# Patient Record
Sex: MALE | Race: WHITE | NOT HISPANIC OR LATINO | Employment: FULL TIME | URBAN - METROPOLITAN AREA
[De-identification: names, ages, dates, MRNs, and addresses within clinical notes are randomized per-mention and may not be internally consistent; named-entity substitution may affect disease eponyms.]

---

## 2019-08-14 ENCOUNTER — HOSPITAL ENCOUNTER (OUTPATIENT)
Dept: LAB | Facility: MEDICAL CENTER | Age: 49
End: 2019-08-14
Attending: FAMILY MEDICINE
Payer: MEDICAID

## 2019-08-14 LAB
ALBUMIN SERPL BCP-MCNC: 4.3 G/DL (ref 3.2–4.9)
ALBUMIN/GLOB SERPL: 1.3 G/DL
ALP SERPL-CCNC: 63 U/L (ref 30–99)
ALT SERPL-CCNC: 18 U/L (ref 2–50)
ANION GAP SERPL CALC-SCNC: 11 MMOL/L (ref 0–11.9)
APPEARANCE UR: CLEAR
AST SERPL-CCNC: 18 U/L (ref 12–45)
BASOPHILS # BLD AUTO: 0.6 % (ref 0–1.8)
BASOPHILS # BLD: 0.05 K/UL (ref 0–0.12)
BILIRUB SERPL-MCNC: 1.2 MG/DL (ref 0.1–1.5)
BILIRUB UR QL STRIP.AUTO: ABNORMAL
BUN SERPL-MCNC: 12 MG/DL (ref 8–22)
CALCIUM SERPL-MCNC: 9 MG/DL (ref 8.5–10.5)
CHLORIDE SERPL-SCNC: 104 MMOL/L (ref 96–112)
CHOLEST SERPL-MCNC: 165 MG/DL (ref 100–199)
CO2 SERPL-SCNC: 23 MMOL/L (ref 20–33)
COLOR UR: YELLOW
CREAT SERPL-MCNC: 0.96 MG/DL (ref 0.5–1.4)
EOSINOPHIL # BLD AUTO: 0.39 K/UL (ref 0–0.51)
EOSINOPHIL NFR BLD: 5 % (ref 0–6.9)
ERYTHROCYTE [DISTWIDTH] IN BLOOD BY AUTOMATED COUNT: 40 FL (ref 35.9–50)
FASTING STATUS PATIENT QL REPORTED: NORMAL
GLOBULIN SER CALC-MCNC: 3.2 G/DL (ref 1.9–3.5)
GLUCOSE SERPL-MCNC: 99 MG/DL (ref 65–99)
GLUCOSE UR STRIP.AUTO-MCNC: NEGATIVE MG/DL
HCT VFR BLD AUTO: 45 % (ref 42–52)
HDLC SERPL-MCNC: 53 MG/DL
HGB BLD-MCNC: 15.2 G/DL (ref 14–18)
IMM GRANULOCYTES # BLD AUTO: 0.02 K/UL (ref 0–0.11)
IMM GRANULOCYTES NFR BLD AUTO: 0.3 % (ref 0–0.9)
KETONES UR STRIP.AUTO-MCNC: ABNORMAL MG/DL
LDLC SERPL CALC-MCNC: 90 MG/DL
LEUKOCYTE ESTERASE UR QL STRIP.AUTO: NEGATIVE
LYMPHOCYTES # BLD AUTO: 1.76 K/UL (ref 1–4.8)
LYMPHOCYTES NFR BLD: 22.6 % (ref 22–41)
MCH RBC QN AUTO: 31.3 PG (ref 27–33)
MCHC RBC AUTO-ENTMCNC: 33.8 G/DL (ref 33.7–35.3)
MCV RBC AUTO: 92.6 FL (ref 81.4–97.8)
MICRO URNS: ABNORMAL
MONOCYTES # BLD AUTO: 0.75 K/UL (ref 0–0.85)
MONOCYTES NFR BLD AUTO: 9.6 % (ref 0–13.4)
NEUTROPHILS # BLD AUTO: 4.83 K/UL (ref 1.82–7.42)
NEUTROPHILS NFR BLD: 61.9 % (ref 44–72)
NITRITE UR QL STRIP.AUTO: NEGATIVE
NRBC # BLD AUTO: 0 K/UL
NRBC BLD-RTO: 0 /100 WBC
PH UR STRIP.AUTO: 5.5 [PH] (ref 5–8)
PLATELET # BLD AUTO: 188 K/UL (ref 164–446)
PMV BLD AUTO: 10.9 FL (ref 9–12.9)
POTASSIUM SERPL-SCNC: 3.4 MMOL/L (ref 3.6–5.5)
PROT SERPL-MCNC: 7.5 G/DL (ref 6–8.2)
PROT UR QL STRIP: NEGATIVE MG/DL
RBC # BLD AUTO: 4.86 M/UL (ref 4.7–6.1)
RBC UR QL AUTO: NEGATIVE
SODIUM SERPL-SCNC: 138 MMOL/L (ref 135–145)
SP GR UR STRIP.AUTO: 1.02
TRIGL SERPL-MCNC: 108 MG/DL (ref 0–149)
UROBILINOGEN UR STRIP.AUTO-MCNC: 1 MG/DL
WBC # BLD AUTO: 7.8 K/UL (ref 4.8–10.8)

## 2019-08-14 PROCEDURE — 83036 HEMOGLOBIN GLYCOSYLATED A1C: CPT

## 2019-08-14 PROCEDURE — 80061 LIPID PANEL: CPT

## 2019-08-14 PROCEDURE — 81003 URINALYSIS AUTO W/O SCOPE: CPT

## 2019-08-14 PROCEDURE — 85025 COMPLETE CBC W/AUTO DIFF WBC: CPT

## 2019-08-14 PROCEDURE — 80053 COMPREHEN METABOLIC PANEL: CPT

## 2019-08-14 PROCEDURE — 36415 COLL VENOUS BLD VENIPUNCTURE: CPT

## 2019-08-15 LAB
EST. AVERAGE GLUCOSE BLD GHB EST-MCNC: 105 MG/DL
HBA1C MFR BLD: 5.3 % (ref 0–5.6)

## 2019-08-26 ENCOUNTER — HOSPITAL ENCOUNTER (OUTPATIENT)
Dept: LAB | Facility: MEDICAL CENTER | Age: 49
End: 2019-08-26
Attending: PHYSICIAN ASSISTANT
Payer: MEDICAID

## 2019-08-26 LAB
ANION GAP SERPL CALC-SCNC: 6 MMOL/L (ref 0–11.9)
BUN SERPL-MCNC: 13 MG/DL (ref 8–22)
CALCIUM SERPL-MCNC: 9.5 MG/DL (ref 8.5–10.5)
CHLORIDE SERPL-SCNC: 106 MMOL/L (ref 96–112)
CO2 SERPL-SCNC: 28 MMOL/L (ref 20–33)
CREAT SERPL-MCNC: 1.11 MG/DL (ref 0.5–1.4)
GLUCOSE SERPL-MCNC: 99 MG/DL (ref 65–99)
POTASSIUM SERPL-SCNC: 4.2 MMOL/L (ref 3.6–5.5)
SODIUM SERPL-SCNC: 140 MMOL/L (ref 135–145)

## 2019-08-26 PROCEDURE — 36415 COLL VENOUS BLD VENIPUNCTURE: CPT

## 2019-08-26 PROCEDURE — 80048 BASIC METABOLIC PNL TOTAL CA: CPT

## 2019-09-11 ENCOUNTER — NON-PROVIDER VISIT (OUTPATIENT)
Dept: URGENT CARE | Facility: CLINIC | Age: 49
End: 2019-09-11

## 2019-09-11 DIAGNOSIS — Z02.1 PRE-EMPLOYMENT DRUG SCREENING: ICD-10-CM

## 2019-09-11 LAB
AMP AMPHETAMINE: NORMAL
COC COCAINE: NORMAL
INT CON NEG: NEGATIVE
INT CON POS: POSITIVE
MET METHAMPHETAMINES: NORMAL
OPI OPIATES: NORMAL
PCP PHENCYCLIDINE: NORMAL
POC DRUG COMMENT 753798-OCCUPATIONAL HEALTH: NEGATIVE
THC: NORMAL

## 2019-09-11 PROCEDURE — 80305 DRUG TEST PRSMV DIR OPT OBS: CPT | Performed by: PHYSICIAN ASSISTANT

## 2022-02-19 ENCOUNTER — OFFICE VISIT (OUTPATIENT)
Dept: URGENT CARE | Facility: CLINIC | Age: 52
End: 2022-02-19
Payer: COMMERCIAL

## 2022-02-19 VITALS
SYSTOLIC BLOOD PRESSURE: 130 MMHG | HEART RATE: 105 BPM | DIASTOLIC BLOOD PRESSURE: 88 MMHG | BODY MASS INDEX: 38.36 KG/M2 | TEMPERATURE: 97.1 F | OXYGEN SATURATION: 95 % | RESPIRATION RATE: 16 BRPM | HEIGHT: 76 IN | WEIGHT: 315 LBS

## 2022-02-19 DIAGNOSIS — J98.01 ACUTE BRONCHOSPASM: ICD-10-CM

## 2022-02-19 DIAGNOSIS — J22 ACUTE LOWER RESPIRATORY INFECTION: ICD-10-CM

## 2022-02-19 DIAGNOSIS — R05.3 PERSISTENT COUGH FOR 3 WEEKS OR LONGER: Primary | ICD-10-CM

## 2022-02-19 PROCEDURE — 99203 OFFICE O/P NEW LOW 30 MIN: CPT | Performed by: PHYSICIAN ASSISTANT

## 2022-02-19 RX ORDER — DOXYCYCLINE HYCLATE 100 MG
100 TABLET ORAL 2 TIMES DAILY
Qty: 20 TABLET | Refills: 0 | Status: SHIPPED | OUTPATIENT
Start: 2022-02-19 | End: 2022-03-01

## 2022-02-19 RX ORDER — LORAZEPAM 1 MG/1
TABLET ORAL
COMMUNITY
Start: 2022-01-12

## 2022-02-19 RX ORDER — CITALOPRAM 40 MG/1
TABLET ORAL
COMMUNITY
Start: 2022-02-08 | End: 2023-05-09

## 2022-02-19 RX ORDER — DEXTROMETHORPHAN HYDROBROMIDE AND PROMETHAZINE HYDROCHLORIDE 15; 6.25 MG/5ML; MG/5ML
5 SYRUP ORAL EVERY 4 HOURS PRN
Qty: 180 ML | Refills: 0 | Status: SHIPPED | OUTPATIENT
Start: 2022-02-19 | End: 2022-03-01

## 2022-02-19 RX ORDER — PREDNISONE 20 MG/1
TABLET ORAL
Qty: 20 TABLET | Refills: 0 | Status: SHIPPED | OUTPATIENT
Start: 2022-02-19 | End: 2023-05-09

## 2022-02-19 ASSESSMENT — ENCOUNTER SYMPTOMS: SHORTNESS OF BREATH: 1

## 2022-02-19 NOTE — PROGRESS NOTES
Subjective     Eric Fitzgerald is a 51 y.o. male who presents with Shortness of Breath (Fatigue, headaches, body aches, slight chest and abdominal pain, diarrhea, x1 month )    Medications:    • citalopram Tabs  • LORazepam Tabs    Allergies: Penicillins    Problem List: Eric Fitzgerald does not have a problem list on file.    Surgical History:  No past surgical history on file.    Past Social Hx: Eric Fitzgerald       Past Family Hx:  Eric Fitzgerald family history is not on file.     Problem list, medications, and allergies reviewed by myself today in Epic.          Patient presents with persistent cough for 1 month, mild shortness of breath, fatigue, headache and body aches, abdominal pain and diarrhea.  Patient has tried a number of over-the-counter cough cold medications with very little improvement in his symptoms.  Patient denies any other complaint.    Cough  This is a new problem. Episode onset: 1 month. The problem has been gradually worsening. The problem occurs every few minutes. The cough is productive of sputum. Associated symptoms include headaches, shortness of breath and wheezing. Pertinent negatives include no chest pain, chills, fever, nasal congestion or postnasal drip. The symptoms are aggravated by cold air and exercise. He has tried body position changes, OTC cough suppressant and rest for the symptoms. The treatment provided no relief. His past medical history is significant for bronchitis. There is no history of asthma, COPD, emphysema or pneumonia.       Review of Systems   Constitutional: Negative for chills and fever.   HENT: Negative for congestion, postnasal drip and sinus pain.    Respiratory: Positive for cough, sputum production, shortness of breath and wheezing.    Cardiovascular: Negative for chest pain, palpitations and leg swelling.   Neurological: Positive for headaches.   All other systems reviewed and are negative.             Objective     /88 (BP  "Location: Right arm, Patient Position: Sitting, BP Cuff Size: Adult long)   Pulse (!) 105   Temp 36.2 °C (97.1 °F) (Temporal)   Resp 16   Ht 1.93 m (6' 4\")   Wt (!) 167 kg (368 lb 3.2 oz)   SpO2 95%   BMI 44.82 kg/m²      Physical Exam  Vitals and nursing note reviewed.   Constitutional:       General: He is not in acute distress.     Appearance: Normal appearance. He is well-developed. He is obese. He is not ill-appearing or toxic-appearing.   HENT:      Head: Normocephalic and atraumatic.      Right Ear: Tympanic membrane normal.      Left Ear: Tympanic membrane normal.      Nose: Nose normal.      Mouth/Throat:      Lips: Pink.      Mouth: Mucous membranes are moist.      Pharynx: Oropharynx is clear. Uvula midline.   Eyes:      Extraocular Movements: Extraocular movements intact.      Conjunctiva/sclera: Conjunctivae normal.      Pupils: Pupils are equal, round, and reactive to light.   Cardiovascular:      Rate and Rhythm: Normal rate and regular rhythm.      Pulses: Normal pulses.      Heart sounds: Normal heart sounds.   Pulmonary:      Effort: Pulmonary effort is normal. No respiratory distress.      Breath sounds: No stridor. Wheezing and rhonchi present. No rales.   Abdominal:      General: Bowel sounds are normal.      Palpations: Abdomen is soft.   Musculoskeletal:         General: Normal range of motion.      Cervical back: Normal range of motion and neck supple.   Skin:     General: Skin is warm and dry.      Capillary Refill: Capillary refill takes less than 2 seconds.   Neurological:      General: No focal deficit present.      Mental Status: He is alert and oriented to person, place, and time.      Cranial Nerves: No cranial nerve deficit.      Motor: Motor function is intact.      Coordination: Coordination is intact.      Gait: Gait normal.   Psychiatric:         Mood and Affect: Mood normal.                   Assessment & Plan               1. Persistent cough for 3 weeks or longer  " doxycycline (VIBRAMYCIN) 100 MG Tab    predniSONE (DELTASONE) 20 MG Tab    promethazine-dextromethorphan (PROMETHAZINE-DM) 6.25-15 MG/5ML syrup   2. Acute lower respiratory infection  doxycycline (VIBRAMYCIN) 100 MG Tab    predniSONE (DELTASONE) 20 MG Tab    promethazine-dextromethorphan (PROMETHAZINE-DM) 6.25-15 MG/5ML syrup   3. Acute bronchospasm  doxycycline (VIBRAMYCIN) 100 MG Tab    predniSONE (DELTASONE) 20 MG Tab    promethazine-dextromethorphan (PROMETHAZINE-DM) 6.25-15 MG/5ML syrup       Patient was evaluated in clinic today while wearing appropriate personal protective equipment.      Patient politely declined Covid testing today, as he is well outside of his window of quarantine for Covid.    PT to begin prescription medications today as discussed for his bronchospasm and persistent cough.    PT should follow up with PCP in 1-2 days for re-evaluation if symptoms have not improved.      Discussed red flags and reasons to return to UC or ED.      Pt and/or family verbalized understanding of diagnosis and follow up instructions and was offered informational handout on diagnosis.  PT discharged.

## 2022-02-19 NOTE — LETTER
February 19, 2022         Patient: Eric Fitzgerald   YOB: 1970   Date of Visit: 2/19/2022           To Whom it May Concern:    Eric Fitzgerald was seen in my clinic on 2/19/2022. He may return to work on 2/23/2022.    If you have any questions or concerns, please don't hesitate to call.        Sincerely,           Karen Spencer P.A.-C.  Electronically Signed

## 2022-02-27 ASSESSMENT — ENCOUNTER SYMPTOMS
WHEEZING: 1
COUGH: 1
FEVER: 0
SPUTUM PRODUCTION: 1
CHILLS: 0
SINUS PAIN: 0
HEADACHES: 1
PALPITATIONS: 0

## 2022-02-27 ASSESSMENT — COPD QUESTIONNAIRES: COPD: 0

## 2023-05-09 ENCOUNTER — OFFICE VISIT (OUTPATIENT)
Dept: URGENT CARE | Facility: CLINIC | Age: 53
End: 2023-05-09
Payer: COMMERCIAL

## 2023-05-09 ENCOUNTER — TELEPHONE (OUTPATIENT)
Dept: URGENT CARE | Facility: CLINIC | Age: 53
End: 2023-05-09

## 2023-05-09 VITALS
OXYGEN SATURATION: 96 % | DIASTOLIC BLOOD PRESSURE: 72 MMHG | HEART RATE: 120 BPM | WEIGHT: 315 LBS | HEIGHT: 76 IN | TEMPERATURE: 97.3 F | BODY MASS INDEX: 38.36 KG/M2 | SYSTOLIC BLOOD PRESSURE: 142 MMHG | RESPIRATION RATE: 18 BRPM

## 2023-05-09 DIAGNOSIS — U07.1 COVID-19: ICD-10-CM

## 2023-05-09 DIAGNOSIS — J06.9 VIRAL URI WITH COUGH: ICD-10-CM

## 2023-05-09 LAB
FLUAV RNA SPEC QL NAA+PROBE: NEGATIVE
FLUBV RNA SPEC QL NAA+PROBE: NEGATIVE
RSV RNA SPEC QL NAA+PROBE: NEGATIVE
SARS-COV-2 RNA RESP QL NAA+PROBE: POSITIVE

## 2023-05-09 PROCEDURE — 99214 OFFICE O/P EST MOD 30 MIN: CPT | Mod: CS | Performed by: NURSE PRACTITIONER

## 2023-05-09 PROCEDURE — 0241U POCT CEPHEID COV-2, FLU A/B, RSV - PCR: CPT | Performed by: NURSE PRACTITIONER

## 2023-05-09 ASSESSMENT — ENCOUNTER SYMPTOMS
SINUS PAIN: 1
DIARRHEA: 0
NAUSEA: 0
HEADACHES: 0
MYALGIAS: 0
FEVER: 0
SORE THROAT: 0
COUGH: 1

## 2023-05-09 NOTE — PROGRESS NOTES
Subjective     Eric Fitzgerald is a 52 y.o. male who presents with Nasal Congestion (Pt states possible sinus infection, pt also states pressure in face )            HPI  New problem.  Patient is a 52-year-old male who presents with nasal congestion, headache, and pressure as well as cough x3 days.  He denies fever, chills, sore throat, nausea, or diarrhea.  He states he is mildly short of breath but this is due to his long COVID.  He has not done a home COVID test for this.  He has been taking over-the-counter Aleve cold and sinus for his symptoms.    Penicillins  Current Outpatient Medications on File Prior to Visit   Medication Sig Dispense Refill    LORazepam (ATIVAN) 1 MG Tab TAKE 1 AND 1/2 TABLET (1.5 MG) BY MOUTH AT BEDTIME FOR ANXIETY AND INSOMNIA       No current facility-administered medications on file prior to visit.     Social History     Socioeconomic History    Marital status: Single     Spouse name: Not on file    Number of children: Not on file    Years of education: Not on file    Highest education level: Not on file   Occupational History    Not on file   Tobacco Use    Smoking status: Never    Smokeless tobacco: Never   Substance and Sexual Activity    Alcohol use: Not on file    Drug use: Not on file    Sexual activity: Not on file   Other Topics Concern    Not on file   Social History Narrative    Not on file     Social Determinants of Health     Financial Resource Strain: Not on file   Food Insecurity: Not on file   Transportation Needs: Not on file   Physical Activity: Not on file   Stress: Not on file   Social Connections: Not on file   Intimate Partner Violence: Not on file   Housing Stability: Not on file     Breast Cancer-related family history is not on file.      Review of Systems   Constitutional:  Positive for malaise/fatigue. Negative for fever.   HENT:  Positive for congestion and sinus pain. Negative for sore throat.    Respiratory:  Positive for cough.    Gastrointestinal:   "Negative for diarrhea and nausea.   Musculoskeletal:  Negative for myalgias.   Neurological:  Negative for headaches.            Objective     BP (!) 142/72   Pulse (!) 120   Temp 36.3 °C (97.3 °F)   Resp 18   Ht 1.93 m (6' 4\")   Wt (!) 178 kg (392 lb)   SpO2 96%   BMI 47.72 kg/m²      Physical Exam  Vitals and nursing note reviewed.   Constitutional:       General: He is not in acute distress.     Appearance: Normal appearance. He is well-developed. He is obese.   HENT:      Head: Normocephalic and atraumatic.      Right Ear: Tympanic membrane, ear canal and external ear normal. No middle ear effusion. Tympanic membrane is not injected or perforated.      Left Ear: Tympanic membrane, ear canal and external ear normal.  No middle ear effusion. Tympanic membrane is not injected or perforated.      Nose: Mucosal edema, congestion and rhinorrhea present.      Mouth/Throat:      Pharynx: No oropharyngeal exudate or posterior oropharyngeal erythema.   Eyes:      General:         Right eye: No discharge.         Left eye: No discharge.      Conjunctiva/sclera: Conjunctivae normal.   Cardiovascular:      Rate and Rhythm: Normal rate and regular rhythm.      Heart sounds: Normal heart sounds. No murmur heard.  Pulmonary:      Effort: Pulmonary effort is normal. No respiratory distress.      Breath sounds: Normal breath sounds.   Musculoskeletal:         General: Normal range of motion.      Cervical back: Normal range of motion and neck supple.      Comments: Normal movement of all 4 extremities.   Lymphadenopathy:      Cervical: No cervical adenopathy.      Upper Body:      Right upper body: No supraclavicular adenopathy.      Left upper body: No supraclavicular adenopathy.   Skin:     General: Skin is warm and dry.   Neurological:      Mental Status: He is alert and oriented to person, place, and time.      Gait: Gait normal.   Psychiatric:         Behavior: Behavior normal.         Thought Content: Thought content " normal.                           Assessment & Plan        1. COVID-19        2. Viral URI with cough  POCT CEPHEID COV-2, FLU A/B, RSV - PCR        Positive covid 19  Paxlovid to pharmacy.  Advised on symptomatic care and isolation.  Differential diagnosis, natural history, supportive care, and indications for immediate follow-up were discussed.

## 2024-05-11 ENCOUNTER — OFFICE VISIT (OUTPATIENT)
Dept: URGENT CARE | Facility: CLINIC | Age: 54
End: 2024-05-11
Payer: MEDICAID

## 2024-05-11 VITALS
DIASTOLIC BLOOD PRESSURE: 78 MMHG | SYSTOLIC BLOOD PRESSURE: 112 MMHG | BODY MASS INDEX: 37.19 KG/M2 | HEART RATE: 80 BPM | RESPIRATION RATE: 18 BRPM | OXYGEN SATURATION: 94 % | TEMPERATURE: 96.7 F | HEIGHT: 77 IN | WEIGHT: 315 LBS

## 2024-05-11 DIAGNOSIS — J06.9 VIRAL UPPER RESPIRATORY TRACT INFECTION: ICD-10-CM

## 2024-05-11 DIAGNOSIS — R09.89 CHEST CONGESTION: ICD-10-CM

## 2024-05-11 DIAGNOSIS — R53.83 OTHER FATIGUE: ICD-10-CM

## 2024-05-11 DIAGNOSIS — R05.1 ACUTE COUGH: ICD-10-CM

## 2024-05-11 LAB
FLUAV RNA SPEC QL NAA+PROBE: NEGATIVE
FLUBV RNA SPEC QL NAA+PROBE: NEGATIVE
RSV RNA SPEC QL NAA+PROBE: NEGATIVE
SARS-COV-2 RNA RESP QL NAA+PROBE: NEGATIVE

## 2024-05-11 PROCEDURE — 3074F SYST BP LT 130 MM HG: CPT

## 2024-05-11 PROCEDURE — 99213 OFFICE O/P EST LOW 20 MIN: CPT

## 2024-05-11 PROCEDURE — 87637 SARSCOV2&INF A&B&RSV AMP PRB: CPT | Mod: QW

## 2024-05-11 PROCEDURE — 3078F DIAST BP <80 MM HG: CPT

## 2024-05-11 RX ORDER — ALBUTEROL SULFATE 90 UG/1
2 AEROSOL, METERED RESPIRATORY (INHALATION) EVERY 6 HOURS PRN
COMMUNITY

## 2024-05-11 RX ORDER — CITALOPRAM 40 MG/1
40 TABLET ORAL
COMMUNITY
Start: 2024-05-07

## 2024-05-11 ASSESSMENT — ENCOUNTER SYMPTOMS
NECK PAIN: 0
WHEEZING: 0
SPUTUM PRODUCTION: 0
HOARSE VOICE: 0
DIZZINESS: 0
CHILLS: 1
COUGH: 1
FEVER: 0
STRIDOR: 0
HEMOPTYSIS: 0
HEADACHES: 1
SINUS PAIN: 0
SWOLLEN GLANDS: 0
SINUS PRESSURE: 0
DIAPHORESIS: 0
SORE THROAT: 0
SHORTNESS OF BREATH: 0

## 2024-05-11 NOTE — PATIENT INSTRUCTIONS
"As we discussed your clinical condition would benefit from over-the-counter remedies:    Congestion:    Nasal rinsing with saline nasal spray or salt water (e.g., neti pot) can help relieve nasal dryness.    Breathe Right nasal strips at night for nasal congestion    Ponaris nasal emmollient for nasal congestion, dryness, and inflammation (do not use with iodine sensitivity)    Cool mist humidification, chest rubs, warm tea with honey, increased fluid intake to thin secretions    SALINE IRRIGATION/SPRAY 2 to 3 times per day    You may consider using a saline nasal irrigation (such as a \"Neti pot\" or similar device using sterile water, NOT tap water) or OTC saline nasal spray. Common brands include Nikhil Med, Sinex, [place] Nasal. May use short term nasal sprays, such as oxymetazoline (Afrin) to help relieve nasal discomfort, congestion, and/or pressure. Decongestant sprays should not be used longer than three consecutive days.     Over the counter medications:    OTC second generation antihistamine daily (cetirizine, desloratadine, fexofenadine, levocetirizine, and loratadine) daily IN COMBINATION WITH:    FLONASE (once per day) x 2 weeks     You may consider intranasal steroids such as fluticasone (brand name Flonase), (other options include Nasonex, Rhinocort, Nasacort) daily; continue for at least 2-3 weeks. Any generic should work as well but may cause slightly more nasal irritation. Please take according to package directions.  This steroid will help reduce inflammation of your sinuses.  This is the number one medication to help with seasonal allergies and treat nasal inflammation.  Cost: around $8 at [facility] for the generic fluticasone [facility] product (as of 5/20).    SUDAFED (low dose to see if tolerated) daily x 4-5 days    You may consider over-the-counter Sudafed (pseudoephedrine) to act as a decongestant to improve your breathing through your nose.  Please do not use this medication if you already have known " high blood pressure.  Please take according to package directions and note that this has a stimulating effect and should not be taken late in the day.  There is a low dose 12 hour formulation and a higher dose 24 hour formulation.  Start with a low dose to make sure you tolerate the medication.  Do not take late in the day as it may interfere with sleep.   Cost: Around $6 for the generic [facility] branded product at a 10mg dose (as of 2/2023).      SORE THROAT:    Symptom management for a sore throat includes:     Sipping cold or warm beverages (eg, tea with honey or lemon)     Eating cold or frozen desserts (eg, ice cream, popsicles)    Sucking on ice    Sucking on hard candy, CEPACOL lozenges, or medicated throat sprays. These contain the active ingredient benzocaine which is an anesthetic agent.  It helps relieve the symptoms of sore throat and may diminish your cough reflex.Please note that taking too frequently may cause harm - pay attention to package directions.    Gargling with warm salt water -  ¼ to ½ teaspoon of salt per 8 ounces (approximately 240 mL) of warm water.  Cool mist humidification  OTC Tylenol/ibuprofen PRN for pain/fever     PAIN OR FEVER:    NSAIDs  You may take Ibuprofen (brand name Motrin or Advil) 600 to 800 mg may be taken up to three times per day taken with food (do not exceed 2400 mg per day).  If you prefer you may consider Naproxen (brand name Naprosyn or Aleve) around 500 mg twice per day with food (do not exceed 1200 mg per day). Please do not take if you have known stomach ulcers or known kidney disease.     TYLENOL  You may take Tylenol according to package directions (1000 mg every 8 hours not to exceed 3000 mg per day).  Please do not consume with any alcohol products, or if you have known or suspected liver disease. These will help reduce inflammation, help with pain control, and can reduce fevers.

## 2024-05-11 NOTE — PROGRESS NOTES
"Subjective:   Eric Fitzgerald is a very pleasant 53 y.o. male who presents for:    Chief Complaint   Patient presents with    Fatigue     Pt has been feeling fatigue, slept all day, runny nose, sinus pressure, headache x 2 weeks       HPI:    Sinus Problem  Episode onset: two weeks ago, the patient developed fatigue and congestion. Yesterday, he developed myalgias, chills, and mild headache. The problem has been gradually worsening since onset. There has been no fever. Associated symptoms include chills, congestion, coughing and headaches. Pertinent negatives include no diaphoresis, ear pain, hoarse voice, neck pain, shortness of breath, sinus pressure, sneezing, sore throat or swollen glands. (Weakness where he felt like he was going to \"pass out.\" He is no longer having this symptom. Runny nose) Treatments tried: Aleve cold and sinus. The treatment provided mild relief.     History of environmental allergies and sinus problems. He has a history recurrent COVID-19 infections that caused him to have severe symptoms.    ROS:    Review of Systems   Constitutional:  Positive for chills and malaise/fatigue. Negative for diaphoresis and fever.   HENT:  Positive for congestion. Negative for ear discharge, ear pain, hoarse voice, sinus pressure, sinus pain, sneezing and sore throat.    Respiratory:  Positive for cough. Negative for hemoptysis, sputum production, shortness of breath, wheezing and stridor.    Musculoskeletal:  Negative for neck pain.   Neurological:  Positive for headaches. Negative for dizziness.       Medications:      Current Outpatient Medications   Medication Sig    citalopram (CELEXA) 40 MG Tab Take 40 mg by mouth every day.    Fexofenadine HCl (ALLEGRA ALLERGY PO) Take  by mouth.    albuterol 108 (90 Base) MCG/ACT Aero Soln inhalation aerosol Inhale 2 Puffs every 6 hours as needed for Shortness of Breath.    Naproxen Sodium (ALEVE PO) Take  by mouth.    LORazepam (ATIVAN) 1 MG Tab TAKE 1 AND 1/2 " TABLET (1.5 MG) BY MOUTH AT BEDTIME FOR ANXIETY AND INSOMNIA       Allergies:     Allergies   Allergen Reactions    Penicillins Shortness of Breath     Patients throat will close.       Problem List:     There is no problem list on file for this patient.      Surgical History:    No past surgical history on file.    Past Social Hx:     Social History     Socioeconomic History    Marital status: Single   Tobacco Use    Smoking status: Never    Smokeless tobacco: Never   Vaping Use    Vaping Use: Never used   Substance and Sexual Activity    Alcohol use: Not Currently    Drug use: Not Currently        Past Family Hx:      History reviewed. No pertinent family history.    Problem list, medications, and allergies reviewed by myself today in Epic.     Objective:     Vitals:    05/11/24 1224   BP: 112/78   Pulse: 80   Resp: 18   Temp: 35.9 °C (96.7 °F)   SpO2: 94%       Physical Exam  Vitals reviewed.   Constitutional:       General: He is not in acute distress.     Appearance: Normal appearance. He is normal weight. He is not ill-appearing, toxic-appearing or diaphoretic.   HENT:      Head: Normocephalic and atraumatic.      Right Ear: Tympanic membrane, ear canal and external ear normal.      Left Ear: Tympanic membrane, ear canal and external ear normal.      Nose: Nose normal. No congestion or rhinorrhea.      Right Sinus: No maxillary sinus tenderness or frontal sinus tenderness.      Left Sinus: No maxillary sinus tenderness or frontal sinus tenderness.      Mouth/Throat:      Mouth: Mucous membranes are moist.      Pharynx: Oropharynx is clear.   Eyes:      Extraocular Movements: Extraocular movements intact.      Conjunctiva/sclera: Conjunctivae normal.      Pupils: Pupils are equal, round, and reactive to light.   Cardiovascular:      Rate and Rhythm: Normal rate and regular rhythm.      Chest Wall: PMI is not displaced. No thrill.      Pulses: Normal pulses. No decreased pulses.      Heart sounds: Normal heart  sounds. Heart sounds not distant.      No systolic murmur is present.      No diastolic murmur is present.      No S3 or S4 sounds.   Pulmonary:      Effort: Pulmonary effort is normal. No respiratory distress.      Breath sounds: Normal breath sounds. No stridor. No wheezing, rhonchi or rales.   Abdominal:      General: Abdomen is flat. Bowel sounds are normal.      Palpations: Abdomen is soft.   Musculoskeletal:         General: Normal range of motion.      Cervical back: Normal range of motion and neck supple. No rigidity or tenderness.      Right lower leg: No edema.      Left lower leg: No edema.   Lymphadenopathy:      Cervical: No cervical adenopathy.   Skin:     General: Skin is warm and dry.   Neurological:      General: No focal deficit present.      Mental Status: He is alert and oriented to person, place, and time. Mental status is at baseline.   Psychiatric:         Mood and Affect: Mood normal.         Behavior: Behavior normal.         Thought Content: Thought content normal.         Judgment: Judgment normal.             Results from POCT:   Results for orders placed or performed in visit on 05/11/24   POCT CoV-2, Flu A/B, RSV by PCR   Result Value Ref Range    SARS-CoV-2 by PCR Negative Negative, Invalid    Influenza virus A RNA Negative Negative, Invalid    Influenza virus B, PCR Negative Negative, Invalid    RSV, PCR Negative Negative, Invalid       Assessment/Plan:     Diagnosis and associated orders:     1. Viral upper respiratory tract infection  - POCT CoV-2, Flu A/B, RSV by PCR    2. Acute cough  - DX-CHEST-2 VIEWS; Future    3. Chest congestion  - DX-CHEST-2 VIEWS; Future    4. Other fatigue        Comments/MDM:     POCT COVID, flu, RSV negative in clinic  No evidence of lower respiratory involvement on exam today    Patient to FU with PCP for more comprehensive workup for fatigue    Recommend symptomatic care:  OTC second generation antihistamine daily (cetirizine, desloratadine,  fexofenadine, levocetirizine, and loratadine) daily IN COMBINATION WITH:  OTC decongestant (Sudafed - Pseudoephedrine) unless contraindication in place, such as hypertension, CAD, narrow-angle glaucoma. Use with caution if the patient has a history of cardiac dysrhythmias, hyperthyroidism, DM, prostatic hypertrophy, and glaucoma should use with caution.  Intranasal fluticasone (Flonase) daily  Nasal saline rinses 2-3 times a day   May use short term nasal sprays, such as oxymetazoline (Afrin) to help relieve nasal discomfort, congestion, and/or pressure. Decongestant sprays should not be used longer than three consecutive days.   Nasal rinsing with saline nasal spray or salt water (e.g., neti pot) can help relieve nasal dryness.  Breathe Right nasal strips at night for nasal congestion  Ponaris nasal emmollient for nasal congestion, dryness, and inflammation (do not use with iodine sensitivity)  Cool mist humidification, chest rubs, warm tea with honey, increased fluid intake to thin secretions  Tylenol or ibuprofen as needed for fever control, body aches, and headaches.    Return precautions discussed with patient.  Patient verbalized understanding and is in agreement with this plan of care.          All questions answered. Patient verbalized understanding and is in agreement with this plan of care.     If symptoms are worsening or not improving in 3-5 days, follow-up with PCP or return to UC. Differential diagnosis, natural history, and supportive care discussed. AVS handout given and reviewed with patient. Patient educated on red flags and when to seek treatment back in ED or UC.     I personally reviewed prior external notes and test results pertinent to today's visit.  I have independently reviewed and interpreted all diagnostics ordered during this urgent care visit.     This dictation has been created using voice recognition software. The accuracy of the dictation is limited by the abilities of the software.  I expect there may be some errors of grammar and possibly content. I made every attempt to manually correct the errors within my dictation. However, errors related to voice recognition software may still exist and should be interpreted within the appropriate context.    This note was electronically signed by LEOBARDO Cisneros

## 2024-05-13 SDOH — HEALTH STABILITY: MENTAL HEALTH
STRESS IS WHEN SOMEONE FEELS TENSE, NERVOUS, ANXIOUS, OR CAN'T SLEEP AT NIGHT BECAUSE THEIR MIND IS TROUBLED. HOW STRESSED ARE YOU?: TO SOME EXTENT

## 2024-05-13 SDOH — ECONOMIC STABILITY: HOUSING INSECURITY
IN THE LAST 12 MONTHS, WAS THERE A TIME WHEN YOU DID NOT HAVE A STEADY PLACE TO SLEEP OR SLEPT IN A SHELTER (INCLUDING NOW)?: NO

## 2024-05-13 SDOH — ECONOMIC STABILITY: TRANSPORTATION INSECURITY
IN THE PAST 12 MONTHS, HAS THE LACK OF TRANSPORTATION KEPT YOU FROM MEDICAL APPOINTMENTS OR FROM GETTING MEDICATIONS?: NO

## 2024-05-13 SDOH — ECONOMIC STABILITY: INCOME INSECURITY: IN THE LAST 12 MONTHS, WAS THERE A TIME WHEN YOU WERE NOT ABLE TO PAY THE MORTGAGE OR RENT ON TIME?: NO

## 2024-05-13 SDOH — ECONOMIC STABILITY: TRANSPORTATION INSECURITY
IN THE PAST 12 MONTHS, HAS LACK OF TRANSPORTATION KEPT YOU FROM MEETINGS, WORK, OR FROM GETTING THINGS NEEDED FOR DAILY LIVING?: NO

## 2024-05-13 SDOH — ECONOMIC STABILITY: FOOD INSECURITY: WITHIN THE PAST 12 MONTHS, YOU WORRIED THAT YOUR FOOD WOULD RUN OUT BEFORE YOU GOT MONEY TO BUY MORE.: NEVER TRUE

## 2024-05-13 SDOH — ECONOMIC STABILITY: FOOD INSECURITY: WITHIN THE PAST 12 MONTHS, THE FOOD YOU BOUGHT JUST DIDN'T LAST AND YOU DIDN'T HAVE MONEY TO GET MORE.: NEVER TRUE

## 2024-05-13 SDOH — HEALTH STABILITY: PHYSICAL HEALTH: ON AVERAGE, HOW MANY MINUTES DO YOU ENGAGE IN EXERCISE AT THIS LEVEL?: 0 MIN

## 2024-05-13 SDOH — HEALTH STABILITY: PHYSICAL HEALTH: ON AVERAGE, HOW MANY DAYS PER WEEK DO YOU ENGAGE IN MODERATE TO STRENUOUS EXERCISE (LIKE A BRISK WALK)?: 0 DAYS

## 2024-05-13 SDOH — ECONOMIC STABILITY: TRANSPORTATION INSECURITY
IN THE PAST 12 MONTHS, HAS LACK OF RELIABLE TRANSPORTATION KEPT YOU FROM MEDICAL APPOINTMENTS, MEETINGS, WORK OR FROM GETTING THINGS NEEDED FOR DAILY LIVING?: NO

## 2024-05-13 SDOH — ECONOMIC STABILITY: HOUSING INSECURITY: IN THE LAST 12 MONTHS, HOW MANY PLACES HAVE YOU LIVED?: 1

## 2024-05-13 ASSESSMENT — SOCIAL DETERMINANTS OF HEALTH (SDOH)
HOW OFTEN DO YOU HAVE SIX OR MORE DRINKS ON ONE OCCASION: NEVER
IN A TYPICAL WEEK, HOW MANY TIMES DO YOU TALK ON THE PHONE WITH FAMILY, FRIENDS, OR NEIGHBORS?: ONCE A WEEK
HOW OFTEN DO YOU ATTENT MEETINGS OF THE CLUB OR ORGANIZATION YOU BELONG TO?: MORE THAN 4 TIMES PER YEAR
HOW OFTEN DO YOU ATTENT MEETINGS OF THE CLUB OR ORGANIZATION YOU BELONG TO?: MORE THAN 4 TIMES PER YEAR
HOW OFTEN DO YOU ATTEND CHURCH OR RELIGIOUS SERVICES?: NEVER
DO YOU BELONG TO ANY CLUBS OR ORGANIZATIONS SUCH AS CHURCH GROUPS UNIONS, FRATERNAL OR ATHLETIC GROUPS, OR SCHOOL GROUPS?: YES
HOW OFTEN DO YOU GET TOGETHER WITH FRIENDS OR RELATIVES?: NEVER
HOW OFTEN DO YOU HAVE A DRINK CONTAINING ALCOHOL: NEVER
WITHIN THE PAST 12 MONTHS, YOU WORRIED THAT YOUR FOOD WOULD RUN OUT BEFORE YOU GOT THE MONEY TO BUY MORE: NEVER TRUE
IN A TYPICAL WEEK, HOW MANY TIMES DO YOU TALK ON THE PHONE WITH FAMILY, FRIENDS, OR NEIGHBORS?: ONCE A WEEK
HOW MANY DRINKS CONTAINING ALCOHOL DO YOU HAVE ON A TYPICAL DAY WHEN YOU ARE DRINKING: PATIENT DOES NOT DRINK
HOW OFTEN DO YOU ATTEND CHURCH OR RELIGIOUS SERVICES?: NEVER
HOW OFTEN DO YOU GET TOGETHER WITH FRIENDS OR RELATIVES?: NEVER
DO YOU BELONG TO ANY CLUBS OR ORGANIZATIONS SUCH AS CHURCH GROUPS UNIONS, FRATERNAL OR ATHLETIC GROUPS, OR SCHOOL GROUPS?: YES
ARE YOU MARRIED, WIDOWED, DIVORCED, SEPARATED, NEVER MARRIED, OR LIVING WITH A PARTNER?: NEVER MARRIED
ARE YOU MARRIED, WIDOWED, DIVORCED, SEPARATED, NEVER MARRIED, OR LIVING WITH A PARTNER?: NEVER MARRIED

## 2024-05-13 ASSESSMENT — LIFESTYLE VARIABLES
HOW OFTEN DO YOU HAVE SIX OR MORE DRINKS ON ONE OCCASION: NEVER
SKIP TO QUESTIONS 9-10: 1
HOW OFTEN DO YOU HAVE A DRINK CONTAINING ALCOHOL: NEVER
AUDIT-C TOTAL SCORE: 0
HOW MANY STANDARD DRINKS CONTAINING ALCOHOL DO YOU HAVE ON A TYPICAL DAY: PATIENT DOES NOT DRINK

## 2024-05-14 ENCOUNTER — APPOINTMENT (OUTPATIENT)
Dept: RADIOLOGY | Facility: MEDICAL CENTER | Age: 54
End: 2024-05-14
Payer: MEDICAID

## 2024-05-14 ENCOUNTER — OFFICE VISIT (OUTPATIENT)
Dept: INTERNAL MEDICINE | Facility: OTHER | Age: 54
End: 2024-05-14
Payer: MEDICAID

## 2024-05-14 ENCOUNTER — HOSPITAL ENCOUNTER (OUTPATIENT)
Dept: LAB | Facility: MEDICAL CENTER | Age: 54
End: 2024-05-14
Payer: MEDICAID

## 2024-05-14 VITALS
BODY MASS INDEX: 37.19 KG/M2 | DIASTOLIC BLOOD PRESSURE: 80 MMHG | SYSTOLIC BLOOD PRESSURE: 135 MMHG | HEIGHT: 77 IN | WEIGHT: 315 LBS | HEART RATE: 76 BPM | TEMPERATURE: 96.8 F | OXYGEN SATURATION: 96 %

## 2024-05-14 DIAGNOSIS — Z11.59 ENCOUNTER FOR HEPATITIS C SCREENING TEST FOR LOW RISK PATIENT: ICD-10-CM

## 2024-05-14 DIAGNOSIS — E66.01 CLASS 3 SEVERE OBESITY DUE TO EXCESS CALORIES WITH BODY MASS INDEX (BMI) OF 45.0 TO 49.9 IN ADULT, UNSPECIFIED WHETHER SERIOUS COMORBIDITY PRESENT (HCC): ICD-10-CM

## 2024-05-14 DIAGNOSIS — Z11.4 SCREENING FOR HIV (HUMAN IMMUNODEFICIENCY VIRUS): ICD-10-CM

## 2024-05-14 DIAGNOSIS — R53.83 FATIGUE, UNSPECIFIED TYPE: ICD-10-CM

## 2024-05-14 DIAGNOSIS — Z76.89 ENCOUNTER TO ESTABLISH CARE: ICD-10-CM

## 2024-05-14 DIAGNOSIS — R09.89 CHEST CONGESTION: ICD-10-CM

## 2024-05-14 DIAGNOSIS — R05.1 ACUTE COUGH: ICD-10-CM

## 2024-05-14 PROBLEM — J45.909 ASTHMA: Status: ACTIVE | Noted: 2024-05-14

## 2024-05-14 PROBLEM — F32.A ANXIETY AND DEPRESSION: Status: ACTIVE | Noted: 2024-05-14

## 2024-05-14 PROBLEM — E66.813 CLASS 3 SEVERE OBESITY DUE TO EXCESS CALORIES WITH BODY MASS INDEX (BMI) OF 45.0 TO 49.9 IN ADULT (HCC): Status: ACTIVE | Noted: 2024-05-14

## 2024-05-14 PROBLEM — F41.9 ANXIETY AND DEPRESSION: Status: ACTIVE | Noted: 2024-05-14

## 2024-05-14 LAB
25(OH)D3 SERPL-MCNC: 18 NG/ML (ref 30–100)
ALBUMIN SERPL BCP-MCNC: 4 G/DL (ref 3.2–4.9)
ALBUMIN/GLOB SERPL: 1.2 G/DL
ALP SERPL-CCNC: 80 U/L (ref 30–99)
ALT SERPL-CCNC: 16 U/L (ref 2–50)
ANION GAP SERPL CALC-SCNC: 13 MMOL/L (ref 7–16)
AST SERPL-CCNC: 20 U/L (ref 12–45)
BASOPHILS # BLD AUTO: 0.7 % (ref 0–1.8)
BASOPHILS # BLD: 0.04 K/UL (ref 0–0.12)
BILIRUB SERPL-MCNC: 0.6 MG/DL (ref 0.1–1.5)
BUN SERPL-MCNC: 13 MG/DL (ref 8–22)
CALCIUM ALBUM COR SERPL-MCNC: 9.2 MG/DL (ref 8.5–10.5)
CALCIUM SERPL-MCNC: 9.2 MG/DL (ref 8.4–10.2)
CHLORIDE SERPL-SCNC: 106 MMOL/L (ref 96–112)
CHOLEST SERPL-MCNC: 201 MG/DL (ref 100–199)
CO2 SERPL-SCNC: 19 MMOL/L (ref 20–33)
CREAT SERPL-MCNC: 0.88 MG/DL (ref 0.5–1.4)
EOSINOPHIL # BLD AUTO: 0.38 K/UL (ref 0–0.51)
EOSINOPHIL NFR BLD: 6.5 % (ref 0–6.9)
ERYTHROCYTE [DISTWIDTH] IN BLOOD BY AUTOMATED COUNT: 40.4 FL (ref 35.9–50)
EST. AVERAGE GLUCOSE BLD GHB EST-MCNC: 108 MG/DL
FASTING STATUS PATIENT QL REPORTED: NORMAL
GFR SERPLBLD CREATININE-BSD FMLA CKD-EPI: 104 ML/MIN/1.73 M 2
GLOBULIN SER CALC-MCNC: 3.3 G/DL (ref 1.9–3.5)
GLUCOSE SERPL-MCNC: 96 MG/DL (ref 65–99)
HBA1C MFR BLD: 5.4 % (ref 4–5.6)
HCT VFR BLD AUTO: 45.1 % (ref 42–52)
HCV AB SER QL: NORMAL
HDLC SERPL-MCNC: 52 MG/DL
HGB BLD-MCNC: 15.7 G/DL (ref 14–18)
HIV 1+2 AB+HIV1 P24 AG SERPL QL IA: NORMAL
IMM GRANULOCYTES # BLD AUTO: 0.02 K/UL (ref 0–0.11)
IMM GRANULOCYTES NFR BLD AUTO: 0.3 % (ref 0–0.9)
LDLC SERPL CALC-MCNC: 126 MG/DL
LYMPHOCYTES # BLD AUTO: 1.59 K/UL (ref 1–4.8)
LYMPHOCYTES NFR BLD: 27.1 % (ref 22–41)
MCH RBC QN AUTO: 31 PG (ref 27–33)
MCHC RBC AUTO-ENTMCNC: 34.8 G/DL (ref 32.3–36.5)
MCV RBC AUTO: 89 FL (ref 81.4–97.8)
MONOCYTES # BLD AUTO: 0.43 K/UL (ref 0–0.85)
MONOCYTES NFR BLD AUTO: 7.3 % (ref 0–13.4)
NEUTROPHILS # BLD AUTO: 3.41 K/UL (ref 1.82–7.42)
NEUTROPHILS NFR BLD: 58.1 % (ref 44–72)
NRBC # BLD AUTO: 0 K/UL
NRBC BLD-RTO: 0 /100 WBC (ref 0–0.2)
PLATELET # BLD AUTO: 211 K/UL (ref 164–446)
PMV BLD AUTO: 10.3 FL (ref 9–12.9)
POTASSIUM SERPL-SCNC: 4.1 MMOL/L (ref 3.6–5.5)
PROT SERPL-MCNC: 7.3 G/DL (ref 6–8.2)
RBC # BLD AUTO: 5.07 M/UL (ref 4.7–6.1)
SODIUM SERPL-SCNC: 138 MMOL/L (ref 135–145)
T4 FREE SERPL-MCNC: 0.93 NG/DL (ref 0.93–1.7)
TRIGL SERPL-MCNC: 113 MG/DL (ref 0–149)
TSH SERPL DL<=0.005 MIU/L-ACNC: 3.68 UIU/ML (ref 0.38–5.33)
WBC # BLD AUTO: 5.9 K/UL (ref 4.8–10.8)

## 2024-05-14 PROCEDURE — 99214 OFFICE O/P EST MOD 30 MIN: CPT | Mod: GC

## 2024-05-14 ASSESSMENT — ENCOUNTER SYMPTOMS
DIAPHORESIS: 1
CONSTIPATION: 0
FEVER: 0
NERVOUS/ANXIOUS: 1
ABDOMINAL PAIN: 0
HEARTBURN: 0
PALPITATIONS: 1
DEPRESSION: 1
DIARRHEA: 0
NAUSEA: 0
COUGH: 0
SHORTNESS OF BREATH: 0
LOSS OF CONSCIOUSNESS: 0
FALLS: 0
CHILLS: 0
BLOOD IN STOOL: 0

## 2024-05-14 ASSESSMENT — LIFESTYLE VARIABLES: SUBSTANCE_ABUSE: 0

## 2024-05-14 ASSESSMENT — PATIENT HEALTH QUESTIONNAIRE - PHQ9: CLINICAL INTERPRETATION OF PHQ2 SCORE: 0

## 2024-05-14 NOTE — PROGRESS NOTES
Teaching Physician Attestation      Level of Participation    I have personally interviewed and examined the patient.  In addition, I discussed with the resident physician the patient's history, exam, assessment and plan in detail.  Topics listed in my addendum were the focus of the visit.  Healthcare maintenance was not addressed this visit unless listed as a topic in my addendum.  I agree with the plan as written along with the following additions/modifications:    New Patient    PMH: bmi 47, ? Asthma with seasonal allergies, depression and anxiety per report followed by psychiatry      Fatigue, possibly multifactorial in the setting of recurrent COVID infections and BMI 47  -present for years and intermittent, no clear triggers although states began after a covid infection initially and had some concurrent weight gain.  Occasional B symptoms (drenching night sweats), will check CBC with differential.  Also check HIV and hepatitis C, in addition vitamin D and TSH given some thyroid symptoms.  Patient states he does not believe his depression or anxiety is contributing, denies clear or stimulant use  -Healthy eating handout given, discussion of BMI 47 as below  -Follow-up in 1 month to continue evaluation/discussed results as needed      Bmi 47  -Check CMP and lipids.  Healthy eating handout given    Addendum: cbc, cmp, A1c, hiv, hep c, tsh nl.  Vit d mildly low, will replete, f/u re elevated bmi as discussed.    Return to clinic 1 month.  PCR.

## 2024-05-14 NOTE — PROGRESS NOTES
Eric Fitzgerald is a 53 y.o. male who presents today with the following:    CC: Establish Care with Primary Care Physician and discuss fatigue    HPI:  Eric Fitzgerald is a 52 YO M with a PMHx anxiety and depression followed by psychiatry Dr. Rivera, obesity, asthma who presents to clinic today for establishment of care and discuss fatigue. He was seen at urgent care 5/11 for 2 weeks of fatigue and rhinorrhea with sinus pressure. Covid/flu/RSV negative. Symptomatic care recommended. Last saw a PCP 4 years ago for palpitations after Covid. An EKG was negative for abnormality per patient report.     He reports he sleeps 15 hours per day because of the fatigue. This fatigue comes and goes, periods of it lasting for months on end since 4 years ago when he got Covid. He can't think of any triggers. He does not feel that his anxiety/depression contribute. He does have some significant stress in his life related to caring for his mother. He does feel like he runs  a bit cold. He has gained 100 pounds over the last 4 years because of inactivity and eating poorly. Does have hair loss (patterned), some dry skin, and depression. He does not get much sunshine. 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 He does not consume significant caffeine and takes no supplements.       Review of Systems   Constitutional:  Positive for diaphoresis and malaise/fatigue. Negative for chills and fever.   Respiratory:  Negative for cough (slight) and shortness of breath (minor occasionally with activity).    Cardiovascular:  Positive for palpitations. Negative for chest pain and leg swelling.   Gastrointestinal:  Negative for abdominal pain, blood in stool, constipation, diarrhea, heartburn and nausea.   Genitourinary:  Negative for hematuria.   Musculoskeletal:  Negative for falls.   Neurological:  Negative for loss of consciousness.   Psychiatric/Behavioral:  Positive  "for depression. Negative for substance abuse and suicidal ideas. The patient is nervous/anxious.          Past Medical History:   Diagnosis Date    Anxiety     Asthma 5/14/2024    Depression          Past Surgical History:   Procedure Laterality Date    TONSILLECTOMY      9 yrs old         Family History   Problem Relation Age of Onset    Hypertension Mother     Hyperlipidemia Mother     Dementia Father     Stroke Father     Psychiatric Illness Brother         commited suicide    Cancer Maternal Grandmother         \"abdomen\"    Heart Disease Paternal Grandmother     Stroke Paternal Grandmother     Heart Disease Paternal Grandfather          Social History     Tobacco Use    Smoking status: Never    Smokeless tobacco: Never   Vaping Use    Vaping Use: Never used   Substance Use Topics    Alcohol use: Not Currently     Comment: occasional, rare    Drug use: Not Currently         Current Outpatient Medications   Medication Sig Dispense Refill    albuterol 108 (90 Base) MCG/ACT Aero Soln inhalation aerosol Inhale 2 Puffs every 6 hours as needed for Shortness of Breath.      citalopram (CELEXA) 40 MG Tab Take 40 mg by mouth every day.      Fexofenadine HCl (ALLEGRA ALLERGY PO) Take  by mouth.      LORazepam (ATIVAN) 1 MG Tab TAKE 1 AND 1/2 TABLET (1.5 MG) BY MOUTH AT BEDTIME FOR ANXIETY AND INSOMNIA       No current facility-administered medications for this visit.       /80 (BP Location: Left arm, Patient Position: Sitting, BP Cuff Size: Adult)   Pulse 76   Temp 36 °C (96.8 °F)   Ht 1.943 m (6' 4.5\")   Wt (!) 180 kg (397 lb)   SpO2 96%   BMI 47.69 kg/m²     Physical Exam  Vitals reviewed.   Constitutional:       General: He is not in acute distress.     Appearance: Normal appearance. He is obese. He is not ill-appearing, toxic-appearing or diaphoretic.   HENT:      Head: Normocephalic and atraumatic.      Mouth/Throat:      Mouth: Mucous membranes are moist.      Pharynx: Oropharynx is clear.   Eyes:      " Extraocular Movements: Extraocular movements intact.   Cardiovascular:      Rate and Rhythm: Normal rate and regular rhythm.      Heart sounds: No murmur heard.     Comments: Faint heart sounds  Pulmonary:      Effort: Pulmonary effort is normal. No respiratory distress.      Breath sounds: Normal breath sounds. No wheezing or rales.   Abdominal:      General: Bowel sounds are normal.      Palpations: Abdomen is soft.      Tenderness: There is no abdominal tenderness.      Comments: Rotund    Musculoskeletal:      Right lower leg: No edema.      Left lower leg: No edema.   Lymphadenopathy:      Head:      Right side of head: No submandibular adenopathy.      Left side of head: No submandibular adenopathy.      Cervical: No cervical adenopathy.      Right cervical: No superficial cervical adenopathy.     Left cervical: No superficial cervical adenopathy.      Upper Body:      Right upper body: No supraclavicular or axillary adenopathy.      Left upper body: No supraclavicular or axillary adenopathy.   Skin:     General: Skin is warm and dry.   Neurological:      General: No focal deficit present.      Mental Status: He is alert and oriented to person, place, and time.   Psychiatric:         Mood and Affect: Mood normal.         Behavior: Behavior normal.         Assessment and Plan:     Fatigue  Patient experiencing 4 years of fatigue that onset after covid. Cyclic, intermittent but will cause him to sleep up to 15 hrs per day. Does have depression and anxiety which can cause fatigue. Could be long covid. Could also be related to severe obesity. Could be related to thyroid dysfunction as well. Malignancy also a possibility. No LAD on exam. No recreational substances or supplements.  -CBC to eval for anemia  -thyroid studies  -vitamin D levels  -weight and mood discussion at future visits      Class 3 severe obesity due to excess calories with body mass index (BMI) of 45.0 to 49.9 in adult (HCC)  BMI 47.7, weight 397  lbs. Poor diet, physically inactive  -CMP, lipid panel, A1c to screen for metabolic syndrome and comorbidity  -weight loss discussions at dedicated visit   -healthy diet handout provided     Encounter for hepatitis C screening test for low risk patient  Screening ordered per [facility]    Screening for HIV (human immunodeficiency virus)  Screening ordered per [facility]    Encounter to establish care  Last PCP visit 4 years ago. Has psych care established. No acute concerns or complaints other than fatigue. See above for details. No med refills needed.        Orders Placed This Encounter    CBC WITH DIFFERENTIAL    Comp Metabolic Panel    TSH    FREE THYROXINE    HEMOGLOBIN A1C    HIV AG/AB COMBO ASSAY SCREENING    HEP C VIRUS ANTIBODY    Lipid Profile    VITAMIN D,25 HYDROXY (DEFICIENCY)           Signed by: Jocelin Woodson M.D.      Jocelin Woodson M.D., PGYII, Internal Medicine  [facility]

## 2024-05-14 NOTE — PATIENT INSTRUCTIONS
Go to lab to get fasting blood work done before your next appointment. Please go fasting (8 hrs no food but do hydrate with plain water)

## 2024-05-14 NOTE — ASSESSMENT & PLAN NOTE
Last PCP visit 4 years ago. Has psych care established. No acute concerns or complaints other than fatigue. See above for details. No med refills needed.

## 2024-05-14 NOTE — ASSESSMENT & PLAN NOTE
Patient experiencing 4 years of fatigue that onset after covid. Cyclic, intermittent but will cause him to sleep up to 15 hrs per day. Does have depression and anxiety which can cause fatigue. Could be long covid. Could also be related to severe obesity. Could be related to thyroid dysfunction as well. Malignancy also a possibility. No LAD on exam. No recreational substances or supplements.  -CBC to eval for anemia  -thyroid studies  -vitamin D levels  -weight and mood discussion at future visits

## 2024-05-14 NOTE — ASSESSMENT & PLAN NOTE
BMI 47.7, weight 397 lbs. Poor diet, physically inactive  -CMP, lipid panel, A1c to screen for metabolic syndrome and comorbidity  -weight loss discussions at dedicated visit   -healthy diet handout provided

## 2024-05-15 DIAGNOSIS — J18.9 PNEUMONIA OF BOTH LOWER LOBES DUE TO INFECTIOUS ORGANISM: ICD-10-CM

## 2024-05-15 RX ORDER — DOXYCYCLINE HYCLATE 100 MG
100 TABLET ORAL 2 TIMES DAILY
Qty: 14 TABLET | Refills: 0 | Status: SHIPPED | OUTPATIENT
Start: 2024-05-15 | End: 2024-05-22

## 2024-05-15 NOTE — PROGRESS NOTES
X-ray images viewed and interpreted by APRN, confirmed by radiology:        RADIOLOGY RESULTS   DX-CHEST-2 VIEWS    Result Date: 5/14/2024 5/14/2024 3:14 PM HISTORY/REASON FOR EXAM:  Cough TECHNIQUE/EXAM DESCRIPTION AND NUMBER OF VIEWS: Two views of the chest. COMPARISON:  None. FINDINGS: Patchy opacities in the bilateral lung bases No pleural effusions, no pneumothorax are appreciated. Normal cardiopericardial silhouette.     Patchy opacities in the bilateral lung bases, atelectasis or infection.           1. Pneumonia of both lower lobes due to infectious organism  - doxycycline (VIBRAMYCIN) 100 MG Tab; Take 1 Tablet by mouth 2 times a day for 7 days.  Dispense: 14 Tablet; Refill: 0     Close symptom management discussed  -Oral hydration (1-2 liters daily) and rest.  -Over the counter cough expectorant as directed; Guaifenesin (Mucinex).   -Tylenol and ibuprofen for pain and fever as directed.    OTC second generation antihistamine daily (cetirizine, desloratadine, fexofenadine, levocetirizine, and loratadine) daily IN COMBINATION WITH:  OTC decongestant (Sudafed - Pseudoephedrine) unless contraindication in place, such as hypertension, CAD, narrow-angle glaucoma. Use with caution if the patient has a history of cardiac dysrhythmias, hyperthyroidism, DM, prostatic hypertrophy, and glaucoma should use with caution.  Intranasal fluticasone (Flonase) daily  Nasal saline rinses 2-3 times a day   May use short term nasal sprays, such as oxymetazoline (Afrin) to help relieve nasal discomfort, congestion, and/or pressure. Decongestant sprays should not be used longer than three consecutive days.   Nasal rinsing with saline nasal spray or salt water (e.g., neti pot) can help relieve nasal dryness.  Breathe Right nasal strips at night for nasal congestion  Ponaris nasal emmollient for nasal congestion, dryness, and inflammation (do not use with iodine sensitivity)  Cool mist humidification, chest rubs, warm tea with  honey, increased fluid intake to thin secretions  Tylenol or ibuprofen as needed for fever control, body aches, and headaches.  Return precautions discussed with patient.  Patient verbalized understanding and is in agreement with this plan of care.   -Recommended follow-up visit in 48 hours to assess for symptoms and improvement. If no improvement in 72 hours, follow up urgently.  -50 years of age and older: recommendation is for follow up chest x-ray, 7-12 weeks following treatment to assess for resolution.  -Influenza vaccination is recommended.  -65 or older should have pneumococcal vaccination.    Follow up with PCP or in clinic for re-evaluation after completion of antibiotics. Follow up urgently for worsening symptoms, increased shortness of breath, persistent fevers, chest pain, weakness. Emergently for tachycardia (fast heart rate), dizziness, confusion.

## 2024-05-17 DIAGNOSIS — E55.9 VITAMIN D DEFICIENCY: ICD-10-CM

## 2024-05-17 RX ORDER — MULTIVIT-MIN/IRON/FOLIC ACID/K 18-600-40
1000 CAPSULE ORAL DAILY
Qty: 90 TABLET | Refills: 1 | Status: SHIPPED | OUTPATIENT
Start: 2024-05-17

## 2024-06-21 ENCOUNTER — OFFICE VISIT (OUTPATIENT)
Dept: INTERNAL MEDICINE | Facility: OTHER | Age: 54
End: 2024-06-21
Payer: MEDICAID

## 2024-06-21 VITALS
WEIGHT: 315 LBS | BODY MASS INDEX: 37.19 KG/M2 | OXYGEN SATURATION: 94 % | DIASTOLIC BLOOD PRESSURE: 77 MMHG | SYSTOLIC BLOOD PRESSURE: 134 MMHG | HEIGHT: 77 IN | HEART RATE: 78 BPM | TEMPERATURE: 96.5 F

## 2024-06-21 DIAGNOSIS — Z12.11 ENCOUNTER FOR SCREENING COLONOSCOPY: ICD-10-CM

## 2024-06-21 DIAGNOSIS — E66.01 CLASS 3 SEVERE OBESITY DUE TO EXCESS CALORIES WITH BODY MASS INDEX (BMI) OF 45.0 TO 49.9 IN ADULT, UNSPECIFIED WHETHER SERIOUS COMORBIDITY PRESENT (HCC): ICD-10-CM

## 2024-06-21 DIAGNOSIS — Z23 NEED FOR TDAP VACCINATION: ICD-10-CM

## 2024-06-21 DIAGNOSIS — E78.00 PURE HYPERCHOLESTEROLEMIA: ICD-10-CM

## 2024-06-21 PROBLEM — R53.83 FATIGUE: Status: RESOLVED | Noted: 2024-05-14 | Resolved: 2024-06-21

## 2024-06-21 PROCEDURE — 90715 TDAP VACCINE 7 YRS/> IM: CPT | Mod: GE

## 2024-06-21 PROCEDURE — 90471 IMMUNIZATION ADMIN: CPT | Mod: GE

## 2024-06-21 PROCEDURE — 99213 OFFICE O/P EST LOW 20 MIN: CPT | Mod: 25,GE

## 2024-06-21 ASSESSMENT — FIBROSIS 4 INDEX: FIB4 SCORE: 1.23

## 2024-06-21 ASSESSMENT — ENCOUNTER SYMPTOMS
PALPITATIONS: 0
WEIGHT LOSS: 0
BLOOD IN STOOL: 0
COUGH: 0
SHORTNESS OF BREATH: 0

## 2024-06-21 NOTE — ASSESSMENT & PLAN NOTE
May 2024 lipid panel with , HDL 52, . ASCVD risk score 4.4%  -No statin recommended  -Dietary management as above  -Repeat lipid panel one year

## 2024-06-21 NOTE — ASSESSMENT & PLAN NOTE
Weight today 397 lbs BMI 47.69, compared to BMI 47.7, weight 397 lbs last month. Thyroid studies WNL. No comcomitant diabetes or HTN but does have DLD. Weight baseline used to be 200s, however weight increased over the years related to using food as coping mechanism. This is no longer the case, now has improved relationship with food and sees psychiatry for mood disorder management.   -Referral to Nutrition   -10,000 steps per day goal   -Follow up on weight at next visit

## 2024-06-21 NOTE — ASSESSMENT & PLAN NOTE
Has not yet had colon cancer screening. No red flag signs, no family history of CRC.   -Referral to GI for screening colonoscopy

## 2024-06-21 NOTE — PROGRESS NOTES
"    Established Patient    Patient Care Team:  Jocelin Woodson M.D. as PCP - General (Internal Medicine)    Eric Fitzgerald is a 54 y.o. male who presents today with the following Chief Complaint(s): Follow up for Diagnoses of Class 3 severe obesity due to excess calories with body mass index (BMI) of 45.0 to 49.9 in adult, unspecified whether serious comorbidity present (HCC), Pure hypercholesterolemia, Need for Tdap vaccination, and Encounter for screening colonoscopy were pertinent to this visit.    HPI:  Eric Fitzgerald is a 52 YO M with a PMHx anxiety and depression followed by psychiatry Dr. Rivera, obesity, asthma who was last seen by me in clinic in May to establish care. At that time we discussed his fatigue and checked labs including CBC, CMP, lipid panel, thyroid studies, vitamin D levels, A1c. See lab results below but in brief, he does not have anemia or other cell line abnormalities, cholesterol is elevated, vitamin D is low, thyroid studies normal. He was started on vitamin D supplementation. Since then he was seen at urgent care and prescribed a course of doxycycline for possible pneumonia. His symptoms including respiratory and palpitations have improved. Today he returns to clinic for discussion of weight.     -Weight history: patient has always worked very long days with long commutes. He ate all his meals on the go. He had been in the low 200lbs range for many years however back in 2007 his brother committed suicide and he used food as part of his coping mechanism and his weight increased to the 300s. Then when Covid hit he was increasing his \"junk\" food and weight increased to the 400s. He has had success with weight loss with walking however is not currently walking because up until now he has had bad fatigue. As far as his diet, he cooks his meals. Examples of what he cooks is tacos, sloppy Lazaro sandwiches, salads. He is trying to reduce sweetened beverages. He would like to hold off on " referral for bariatric surgery/management at this time and start with diet/exercise management.       Review of Systems   Constitutional:  Negative for weight loss.   Respiratory:  Negative for cough and shortness of breath.    Cardiovascular:  Negative for palpitations.   Gastrointestinal:  Negative for blood in stool.       Past Medical History:   Diagnosis Date    Anxiety     Asthma 05/14/2024    Depression     Fatigue 05/14/2024       Social History     Tobacco Use    Smoking status: Never    Smokeless tobacco: Never   Vaping Use    Vaping status: Never Used   Substance Use Topics    Alcohol use: Not Currently     Comment: occasional, rare    Drug use: Not Currently       Current Outpatient Medications   Medication Sig Dispense Refill    Vitamin D, Cholecalciferol, (CHOLECALCIFEROL) 25 MCG (1000 UT) Tab Take 1 Tablet by mouth every day. 90 Tablet 1    citalopram (CELEXA) 40 MG Tab Take 40 mg by mouth every day.      Fexofenadine HCl (ALLEGRA ALLERGY PO) Take  by mouth.      albuterol 108 (90 Base) MCG/ACT Aero Soln inhalation aerosol Inhale 2 Puffs every 6 hours as needed for Shortness of Breath.      LORazepam (ATIVAN) 1 MG Tab TAKE 1 AND 1/2 TABLET (1.5 MG) BY MOUTH AT BEDTIME FOR ANXIETY AND INSOMNIA       No current facility-administered medications for this visit.       Results for orders placed or performed during the hospital encounter of 05/14/24   CBC WITH DIFFERENTIAL   Result Value Ref Range    WBC 5.9 4.8 - 10.8 K/uL    RBC 5.07 4.70 - 6.10 M/uL    Hemoglobin 15.7 14.0 - 18.0 g/dL    Hematocrit 45.1 42.0 - 52.0 %    MCV 89.0 81.4 - 97.8 fL    MCH 31.0 27.0 - 33.0 pg    MCHC 34.8 32.3 - 36.5 g/dL    RDW 40.4 35.9 - 50.0 fL    Platelet Count 211 164 - 446 K/uL    MPV 10.3 9.0 - 12.9 fL    Neutrophils-Polys 58.10 44.00 - 72.00 %    Lymphocytes 27.10 22.00 - 41.00 %    Monocytes 7.30 0.00 - 13.40 %    Eosinophils 6.50 0.00 - 6.90 %    Basophils 0.70 0.00 - 1.80 %    Immature Granulocytes 0.30 0.00 - 0.90  %    Nucleated RBC 0.00 0.00 - 0.20 /100 WBC    Neutrophils (Absolute) 3.41 1.82 - 7.42 K/uL    Lymphs (Absolute) 1.59 1.00 - 4.80 K/uL    Monos (Absolute) 0.43 0.00 - 0.85 K/uL    Eos (Absolute) 0.38 0.00 - 0.51 K/uL    Baso (Absolute) 0.04 0.00 - 0.12 K/uL    Immature Granulocytes (abs) 0.02 0.00 - 0.11 K/uL    NRBC (Absolute) 0.00 K/uL   VITAMIN D,25 HYDROXY (DEFICIENCY)   Result Value Ref Range    25-Hydroxy   Vitamin D 25 18 (L) 30 - 100 ng/mL   Lipid Profile   Result Value Ref Range    Cholesterol,Tot 201 (H) 100 - 199 mg/dL    Triglycerides 113 0 - 149 mg/dL    HDL 52 >=40 mg/dL     (H) <100 mg/dL   HEP C VIRUS ANTIBODY   Result Value Ref Range    Hepatitis C Antibody Non-Reactive Non-Reactive   HIV AG/AB COMBO ASSAY SCREENING   Result Value Ref Range    HIV Ag/Ab Combo Assay Non-Reactive Non Reactive   HEMOGLOBIN A1C   Result Value Ref Range    Glycohemoglobin 5.4 4.0 - 5.6 %    Est Avg Glucose 108 mg/dL   FREE THYROXINE   Result Value Ref Range    Free T-4 0.93 0.93 - 1.70 ng/dL   TSH   Result Value Ref Range    TSH 3.680 0.380 - 5.330 uIU/mL   Comp Metabolic Panel   Result Value Ref Range    Sodium 138 135 - 145 mmol/L    Potassium 4.1 3.6 - 5.5 mmol/L    Chloride 106 96 - 112 mmol/L    Co2 19 (L) 20 - 33 mmol/L    Anion Gap 13.0 7.0 - 16.0    Glucose 96 65 - 99 mg/dL    Bun 13 8 - 22 mg/dL    Creatinine 0.88 0.50 - 1.40 mg/dL    Calcium 9.2 8.4 - 10.2 mg/dL    Correct Calcium 9.2 8.5 - 10.5 mg/dL    AST(SGOT) 20 12 - 45 U/L    ALT(SGPT) 16 2 - 50 U/L    Alkaline Phosphatase 80 30 - 99 U/L    Total Bilirubin 0.6 0.1 - 1.5 mg/dL    Albumin 4.0 3.2 - 4.9 g/dL    Total Protein 7.3 6.0 - 8.2 g/dL    Globulin 3.3 1.9 - 3.5 g/dL    A-G Ratio 1.2 g/dL   FASTING STATUS   Result Value Ref Range    Fasting Status Fasting    ESTIMATED GFR   Result Value Ref Range    GFR (CKD-EPI) 104 >60 mL/min/1.73 m 2       /77 (BP Location: Left arm, Patient Position: Sitting, BP Cuff Size: Adult)   Pulse 78   Temp  "35.8 °C (96.5 °F) (Temporal)   Ht 1.943 m (6' 4.5\")   Wt (!) 180 kg (397 lb)   SpO2 94%   BMI 47.69 kg/m²     Physical Exam  Vitals reviewed.   Constitutional:       General: He is not in acute distress.     Appearance: Normal appearance. He is obese. He is not ill-appearing, toxic-appearing or diaphoretic.   Cardiovascular:      Rate and Rhythm: Normal rate and regular rhythm.      Heart sounds: No murmur heard.  Pulmonary:      Effort: Pulmonary effort is normal. No respiratory distress.      Breath sounds: Normal breath sounds. No wheezing or rales.   Neurological:      Mental Status: He is alert.         Assessment and Plan:     Class 3 severe obesity due to excess calories with body mass index (BMI) of 45.0 to 49.9 in adult (HCC)  Weight today 397 lbs BMI 47.69, compared to BMI 47.7, weight 397 lbs last month. Thyroid studies WNL. No comcomitant diabetes or HTN but does have DLD. Weight baseline used to be 200s, however weight increased over the years related to using food as coping mechanism. This is no longer the case, now has improved relationship with food and sees psychiatry for mood disorder management.   -Referral to Nutrition   -10,000 steps per day goal   -Follow up on weight at next visit     Pure hypercholesterolemia  May 2024 lipid panel with , HDL 52, . ASCVD risk score 4.4%  -No statin recommended  -Dietary management as above  -Repeat lipid panel one year     Encounter for screening colonoscopy  Has not yet had colon cancer screening. No red flag signs, no family history of CRC.   -Referral to GI for screening colonoscopy      Need for Tdap vaccination  Administering booster today        Orders Placed This Encounter    Tdap Vaccine =>8YO IM    Referral to GI for Colonoscopy    Referral to Nutrition Services    Patient identified as having weight management issue.  Appropriate orders and counseling given.           Jocelin Woodson M.D. PGY II  [facility] [employer] [facility] [employer]

## 2024-06-21 NOTE — PATIENT INSTRUCTIONS
I have referred you to Nutrition services  Start back up with your walking now that you are feeling better. Let's aim for 10,000 steps per day   I have referred you GI for a screening colonoscopy for colon cancer

## 2024-08-03 ENCOUNTER — APPOINTMENT (OUTPATIENT)
Dept: URGENT CARE | Facility: CLINIC | Age: 54
End: 2024-08-03
Payer: MEDICAID

## 2024-08-06 ENCOUNTER — OFFICE VISIT (OUTPATIENT)
Dept: INTERNAL MEDICINE | Facility: OTHER | Age: 54
End: 2024-08-06
Payer: MEDICAID

## 2024-08-06 VITALS
SYSTOLIC BLOOD PRESSURE: 116 MMHG | DIASTOLIC BLOOD PRESSURE: 80 MMHG | OXYGEN SATURATION: 94 % | WEIGHT: 315 LBS | HEIGHT: 77 IN | HEART RATE: 66 BPM | BODY MASS INDEX: 37.19 KG/M2 | TEMPERATURE: 97.1 F

## 2024-08-06 DIAGNOSIS — R42 DIZZINESS: ICD-10-CM

## 2024-08-06 DIAGNOSIS — H81.13 BENIGN PAROXYSMAL POSITIONAL VERTIGO DUE TO BILATERAL VESTIBULAR DISORDER: ICD-10-CM

## 2024-08-06 DIAGNOSIS — F13.20 BENZODIAZEPINE DEPENDENCE, CONTINUOUS (HCC): ICD-10-CM

## 2024-08-06 PROCEDURE — 99213 OFFICE O/P EST LOW 20 MIN: CPT | Mod: GE

## 2024-08-06 RX ORDER — MECLIZINE HYDROCHLORIDE 25 MG/1
25 TABLET ORAL 3 TIMES DAILY PRN
Qty: 30 TABLET | Refills: 0 | Status: SHIPPED | OUTPATIENT
Start: 2024-08-06

## 2024-08-06 RX ORDER — MECLIZINE HYDROCHLORIDE 25 MG/1
25 TABLET ORAL 3 TIMES DAILY PRN
Qty: 30 TABLET | Refills: 0 | Status: SHIPPED | OUTPATIENT
Start: 2024-08-06 | End: 2024-08-06

## 2024-08-06 ASSESSMENT — ENCOUNTER SYMPTOMS
PHOTOPHOBIA: 0
FEVER: 0
NERVOUS/ANXIOUS: 1
DIAPHORESIS: 0
LOSS OF CONSCIOUSNESS: 0
BLURRED VISION: 0
DIZZINESS: 1
DOUBLE VISION: 0
FALLS: 0
NAUSEA: 1

## 2024-08-06 ASSESSMENT — LIFESTYLE VARIABLES: SUBSTANCE_ABUSE: 0

## 2024-08-06 ASSESSMENT — FIBROSIS 4 INDEX: FIB4 SCORE: 1.23

## 2024-08-06 NOTE — ASSESSMENT & PLAN NOTE
Has been on Ativan for years via psychiatry for anxiety/insomnia. Due to insurance changes he would like to have med refilled here due to cost associated with his psychiatrist. After counseling he opted to continue filling with his psychiatrist. Does not yet need refills regardless  -If changes his mind, refer to new psychiatrist

## 2024-08-06 NOTE — ASSESSMENT & PLAN NOTE
History of vertigo, now having recurrent dizziness with head turning. Associated with nausea. Silva-Hallpike maneuver positive. No changes to hearing or vision. No head trauma.   -Referral to PT for vestibular therapy  -Meclizine PRN  -If no improvement will need ENT referral

## 2024-08-06 NOTE — PROGRESS NOTES
Established Patient    Patient Care Team:  Joeclin Woodson M.D. as PCP - General (Internal Medicine)    Eric Fitzgerald is a 54 y.o. male who presents today with the following Chief Complaint(s): Follow up for Diagnoses of Dizziness, Benzodiazepine dependence, continuous (HCC), and Benign paroxysmal positional vertigo due to bilateral vestibular disorder were pertinent to this visit.    HPI:  Eric Fitzgerald is a 53 YO M with a PMHx obesity, anxiety and depression last seen by me in June   to discuss his obesity. Today he returns to clinic for discussion of dizziness, Ativan, and back pain.     -Dizziness: has had vertigo before however this feels different. he woke up Friday and started feeling dizzy and nauseous. Turning his head certain ways or laying down makes him dizzy with some nausea. He has a hard time tracking moving objects because it makes him feel dizzy. It does not feel like the room is spinning.     -Back pain: about a month ago he had new back pain (now resolved) that he thought was related to new shoes. He put insoles from his podiatrist in his shoes but this did not help. He felt a knot in the middle of his back and his chiropractor was not helping. He had some financial stress and was wondering if this caused his back pain. He did not do any heavy lifting, no exercising.    -Ativan: takes this for anxiety/sleep through Dr. Roe psychiatry. He has another 26 days.     Review of Systems   Constitutional:  Negative for diaphoresis and fever.   HENT:  Negative for hearing loss and tinnitus.    Eyes:  Negative for blurred vision, double vision and photophobia.   Gastrointestinal:  Positive for nausea.   Musculoskeletal:  Negative for falls.   Neurological:  Positive for dizziness. Negative for loss of consciousness.   Psychiatric/Behavioral:  Negative for substance abuse and suicidal ideas. The patient is nervous/anxious.        Past Medical History:   Diagnosis Date    Anxiety     Asthma  "05/14/2024    Depression     Fatigue 05/14/2024       Social History     Tobacco Use    Smoking status: Never    Smokeless tobacco: Never   Vaping Use    Vaping status: Never Used   Substance Use Topics    Alcohol use: Not Currently     Comment: occasional, rare    Drug use: Not Currently       Current Outpatient Medications   Medication Sig Dispense Refill    multivitamin Tab Take 1 Tablet by mouth every day.      Ascorbic Acid (VITAMIN C PO) Take  by mouth.      B Complex Vitamins (VITAMIN B COMPLEX PO) Take  by mouth.      meclizine (ANTIVERT) 25 MG Tab Take 1 Tablet by mouth 3 times a day as needed for Dizziness, Vertigo or Nausea/Vomiting. 30 Tablet 0    Vitamin D, Cholecalciferol, (CHOLECALCIFEROL) 25 MCG (1000 UT) Tab Take 1 Tablet by mouth every day. 90 Tablet 1    citalopram (CELEXA) 40 MG Tab Take 40 mg by mouth every day.      Fexofenadine HCl (ALLEGRA ALLERGY PO) Take  by mouth.      albuterol 108 (90 Base) MCG/ACT Aero Soln inhalation aerosol Inhale 2 Puffs every 6 hours as needed for Shortness of Breath.      LORazepam (ATIVAN) 1 MG Tab TAKE 1 AND 1/2 TABLET (1.5 MG) BY MOUTH AT BEDTIME FOR ANXIETY AND INSOMNIA       No current facility-administered medications for this visit.       /80 (BP Location: Right arm, Patient Position: Sitting, BP Cuff Size: Large adult)   Pulse 66   Temp 36.2 °C (97.1 °F) (Temporal)   Ht 1.943 m (6' 4.5\")   Wt (!) 177 kg (391 lb)   SpO2 94%   BMI 46.97 kg/m²     Physical Exam  Vitals reviewed.   Constitutional:       General: He is not in acute distress.     Appearance: Normal appearance. He is obese. He is not ill-appearing, toxic-appearing or diaphoretic.   HENT:      Head: Normocephalic and atraumatic.   Eyes:      General: No scleral icterus.     Extraocular Movements: Extraocular movements intact.   Cardiovascular:      Rate and Rhythm: Normal rate.   Pulmonary:      Effort: Pulmonary effort is normal.   Musculoskeletal:         General: No swelling, " tenderness, deformity or signs of injury. Normal range of motion.   Neurological:      Mental Status: He is alert and oriented to person, place, and time.      Motor: No weakness.      Gait: Gait normal.      Comments: Upon bending forward patient became dizzy. During [place]-Hallpike maneuver patient reported severe dizziness           Assessment and Plan:     Benign paroxysmal positional vertigo due to bilateral vestibular disorder  History of vertigo, now having recurrent dizziness with head turning. Associated with nausea. [place]-Hallpike maneuver positive. No changes to hearing or vision. No head trauma.   -Referral to PT for vestibular therapy  -Meclizine PRN  -If no improvement will need ENT referral     Benzodiazepine dependence, continuous (HCC)  Has been on Ativan for years via psychiatry for anxiety/insomnia. Due to insurance changes he would like to have med refilled here due to cost associated with his psychiatrist. After counseling he opted to continue filling with his psychiatrist. Does not yet need refills regardless  -If changes his mind, refer to new psychiatrist        Orders Placed This Encounter    Referral to Physical Therapy    meclizine (ANTIVERT) 25 MG Tab           Jocelin Woodson M.D. PGY III  [facility] [employer]

## 2024-08-06 NOTE — PATIENT INSTRUCTIONS
I am referring you to physical therapy for your vertigo. If you do not hear back with the referral please let me know  Take Meclizine as needed for vertigo/dizziness

## 2024-08-08 ENCOUNTER — APPOINTMENT (OUTPATIENT)
Dept: INTERNAL MEDICINE | Facility: OTHER | Age: 54
End: 2024-08-08
Payer: MEDICAID

## 2024-09-19 ENCOUNTER — APPOINTMENT (OUTPATIENT)
Dept: INTERNAL MEDICINE | Facility: OTHER | Age: 54
End: 2024-09-19
Payer: MEDICAID

## 2024-10-02 ENCOUNTER — TELEPHONE (OUTPATIENT)
Dept: URGENT CARE | Facility: CLINIC | Age: 54
End: 2024-10-02

## 2024-10-02 ENCOUNTER — OFFICE VISIT (OUTPATIENT)
Dept: URGENT CARE | Facility: CLINIC | Age: 54
End: 2024-10-02
Payer: MEDICAID

## 2024-10-02 VITALS
OXYGEN SATURATION: 97 % | SYSTOLIC BLOOD PRESSURE: 126 MMHG | TEMPERATURE: 98.4 F | BODY MASS INDEX: 37.19 KG/M2 | WEIGHT: 315 LBS | DIASTOLIC BLOOD PRESSURE: 84 MMHG | HEART RATE: 100 BPM | RESPIRATION RATE: 16 BRPM | HEIGHT: 77 IN

## 2024-10-02 DIAGNOSIS — J06.9 VIRAL URI WITH COUGH: ICD-10-CM

## 2024-10-02 PROCEDURE — 3079F DIAST BP 80-89 MM HG: CPT

## 2024-10-02 PROCEDURE — 3074F SYST BP LT 130 MM HG: CPT

## 2024-10-02 PROCEDURE — 87637 SARSCOV2&INF A&B&RSV AMP PRB: CPT | Mod: QW

## 2024-10-02 PROCEDURE — 99213 OFFICE O/P EST LOW 20 MIN: CPT

## 2024-10-02 ASSESSMENT — ENCOUNTER SYMPTOMS
CHILLS: 0
WHEEZING: 0
HEMOPTYSIS: 0
SPUTUM PRODUCTION: 0
FEVER: 0
SORE THROAT: 1
HEADACHES: 1
COUGH: 1
SHORTNESS OF BREATH: 0

## 2024-10-02 ASSESSMENT — FIBROSIS 4 INDEX: FIB4 SCORE: 1.23

## 2024-10-04 ENCOUNTER — APPOINTMENT (OUTPATIENT)
Dept: URGENT CARE | Facility: CLINIC | Age: 54
End: 2024-10-04
Payer: MEDICAID

## 2024-10-08 ENCOUNTER — OFFICE VISIT (OUTPATIENT)
Dept: URGENT CARE | Facility: CLINIC | Age: 54
End: 2024-10-08
Payer: MEDICAID

## 2024-10-08 VITALS
WEIGHT: 315 LBS | SYSTOLIC BLOOD PRESSURE: 132 MMHG | OXYGEN SATURATION: 97 % | BODY MASS INDEX: 37.19 KG/M2 | DIASTOLIC BLOOD PRESSURE: 88 MMHG | TEMPERATURE: 98.5 F | HEIGHT: 77 IN | HEART RATE: 95 BPM | RESPIRATION RATE: 18 BRPM

## 2024-10-08 DIAGNOSIS — J45.21 MILD INTERMITTENT ASTHMA WITH EXACERBATION: ICD-10-CM

## 2024-10-08 DIAGNOSIS — J22 LRTI (LOWER RESPIRATORY TRACT INFECTION): ICD-10-CM

## 2024-10-08 DIAGNOSIS — J01.00 ACUTE MAXILLARY SINUSITIS, RECURRENCE NOT SPECIFIED: ICD-10-CM

## 2024-10-08 PROCEDURE — 3079F DIAST BP 80-89 MM HG: CPT | Performed by: PHYSICIAN ASSISTANT

## 2024-10-08 PROCEDURE — 99214 OFFICE O/P EST MOD 30 MIN: CPT | Performed by: PHYSICIAN ASSISTANT

## 2024-10-08 PROCEDURE — 3075F SYST BP GE 130 - 139MM HG: CPT | Performed by: PHYSICIAN ASSISTANT

## 2024-10-08 RX ORDER — METHYLPREDNISOLONE 4 MG/1
TABLET ORAL
Qty: 21 TABLET | Refills: 0 | Status: SHIPPED | OUTPATIENT
Start: 2024-10-08 | End: 2024-10-24

## 2024-10-08 RX ORDER — DOXYCYCLINE HYCLATE 100 MG
100 TABLET ORAL 2 TIMES DAILY
Qty: 14 TABLET | Refills: 0 | Status: SHIPPED | OUTPATIENT
Start: 2024-10-08 | End: 2024-10-15

## 2024-10-08 ASSESSMENT — ENCOUNTER SYMPTOMS
SHORTNESS OF BREATH: 0
SINUS PAIN: 1
VOMITING: 0
COUGH: 1
WHEEZING: 0
SPUTUM PRODUCTION: 0
CHILLS: 0
NAUSEA: 0
SORE THROAT: 0
DIARRHEA: 0
ABDOMINAL PAIN: 0
FEVER: 0

## 2024-10-08 ASSESSMENT — FIBROSIS 4 INDEX: FIB4 SCORE: 1.23

## 2024-10-24 ENCOUNTER — APPOINTMENT (OUTPATIENT)
Dept: RADIOLOGY | Facility: MEDICAL CENTER | Age: 54
End: 2024-10-24
Payer: MEDICAID

## 2024-10-24 ENCOUNTER — HOSPITAL ENCOUNTER (OUTPATIENT)
Dept: LAB | Facility: MEDICAL CENTER | Age: 54
End: 2024-10-24
Payer: MEDICAID

## 2024-10-24 ENCOUNTER — OFFICE VISIT (OUTPATIENT)
Dept: INTERNAL MEDICINE | Facility: OTHER | Age: 54
End: 2024-10-24
Payer: MEDICAID

## 2024-10-24 VITALS
SYSTOLIC BLOOD PRESSURE: 134 MMHG | WEIGHT: 315 LBS | HEART RATE: 75 BPM | OXYGEN SATURATION: 95 % | TEMPERATURE: 95.9 F | HEIGHT: 77 IN | BODY MASS INDEX: 37.19 KG/M2 | DIASTOLIC BLOOD PRESSURE: 88 MMHG

## 2024-10-24 DIAGNOSIS — R05.2 SUBACUTE COUGH: ICD-10-CM

## 2024-10-24 DIAGNOSIS — R53.83 FATIGUE, UNSPECIFIED TYPE: ICD-10-CM

## 2024-10-24 LAB
BASOPHILS # BLD AUTO: 0.7 % (ref 0–1.8)
BASOPHILS # BLD: 0.04 K/UL (ref 0–0.12)
EOSINOPHIL # BLD AUTO: 0.24 K/UL (ref 0–0.51)
EOSINOPHIL NFR BLD: 4 % (ref 0–6.9)
ERYTHROCYTE [DISTWIDTH] IN BLOOD BY AUTOMATED COUNT: 39.6 FL (ref 35.9–50)
HCT VFR BLD AUTO: 44.5 % (ref 42–52)
HGB BLD-MCNC: 15.7 G/DL (ref 14–18)
IMM GRANULOCYTES # BLD AUTO: 0.01 K/UL (ref 0–0.11)
IMM GRANULOCYTES NFR BLD AUTO: 0.2 % (ref 0–0.9)
LYMPHOCYTES # BLD AUTO: 1.5 K/UL (ref 1–4.8)
LYMPHOCYTES NFR BLD: 25 % (ref 22–41)
MCH RBC QN AUTO: 32 PG (ref 27–33)
MCHC RBC AUTO-ENTMCNC: 35.3 G/DL (ref 32.3–36.5)
MCV RBC AUTO: 90.6 FL (ref 81.4–97.8)
MONOCYTES # BLD AUTO: 0.4 K/UL (ref 0–0.85)
MONOCYTES NFR BLD AUTO: 6.7 % (ref 0–13.4)
NEUTROPHILS # BLD AUTO: 3.81 K/UL (ref 1.82–7.42)
NEUTROPHILS NFR BLD: 63.4 % (ref 44–72)
NRBC # BLD AUTO: 0 K/UL
NRBC BLD-RTO: 0 /100 WBC (ref 0–0.2)
PLATELET # BLD AUTO: 191 K/UL (ref 164–446)
PMV BLD AUTO: 10.4 FL (ref 9–12.9)
PROCALCITONIN SERPL-MCNC: 0.06 NG/ML
RBC # BLD AUTO: 4.91 M/UL (ref 4.7–6.1)
WBC # BLD AUTO: 6 K/UL (ref 4.8–10.8)

## 2024-10-24 PROCEDURE — 84145 PROCALCITONIN (PCT): CPT

## 2024-10-24 PROCEDURE — 36415 COLL VENOUS BLD VENIPUNCTURE: CPT

## 2024-10-24 PROCEDURE — 99214 OFFICE O/P EST MOD 30 MIN: CPT | Mod: GC

## 2024-10-24 PROCEDURE — 71046 X-RAY EXAM CHEST 2 VIEWS: CPT

## 2024-10-24 PROCEDURE — 85025 COMPLETE CBC W/AUTO DIFF WBC: CPT

## 2024-10-24 ASSESSMENT — ENCOUNTER SYMPTOMS
COUGH: 1
DIAPHORESIS: 1
NAUSEA: 0
SORE THROAT: 0
TINGLING: 0
WHEEZING: 1
CHILLS: 1
FEVER: 1
SINUS PAIN: 0
SHORTNESS OF BREATH: 0
SPUTUM PRODUCTION: 0
HEARTBURN: 0
HEMOPTYSIS: 0

## 2024-10-24 ASSESSMENT — FIBROSIS 4 INDEX: FIB4 SCORE: 1.23

## 2024-10-28 ENCOUNTER — TELEPHONE (OUTPATIENT)
Dept: INTERNAL MEDICINE | Facility: OTHER | Age: 54
End: 2024-10-28
Payer: MEDICAID

## 2024-11-05 SDOH — ECONOMIC STABILITY: INCOME INSECURITY: IN THE LAST 12 MONTHS, WAS THERE A TIME WHEN YOU WERE NOT ABLE TO PAY THE MORTGAGE OR RENT ON TIME?: YES

## 2024-11-05 SDOH — HEALTH STABILITY: PHYSICAL HEALTH: ON AVERAGE, HOW MANY MINUTES DO YOU ENGAGE IN EXERCISE AT THIS LEVEL?: 0 MIN

## 2024-11-05 SDOH — ECONOMIC STABILITY: FOOD INSECURITY: WITHIN THE PAST 12 MONTHS, THE FOOD YOU BOUGHT JUST DIDN'T LAST AND YOU DIDN'T HAVE MONEY TO GET MORE.: SOMETIMES TRUE

## 2024-11-05 SDOH — ECONOMIC STABILITY: INCOME INSECURITY: HOW HARD IS IT FOR YOU TO PAY FOR THE VERY BASICS LIKE FOOD, HOUSING, MEDICAL CARE, AND HEATING?: HARD

## 2024-11-05 SDOH — ECONOMIC STABILITY: FOOD INSECURITY: WITHIN THE PAST 12 MONTHS, YOU WORRIED THAT YOUR FOOD WOULD RUN OUT BEFORE YOU GOT MONEY TO BUY MORE.: SOMETIMES TRUE

## 2024-11-05 SDOH — HEALTH STABILITY: PHYSICAL HEALTH: ON AVERAGE, HOW MANY DAYS PER WEEK DO YOU ENGAGE IN MODERATE TO STRENUOUS EXERCISE (LIKE A BRISK WALK)?: 0 DAYS

## 2024-11-05 SDOH — ECONOMIC STABILITY: HOUSING INSECURITY
IN THE LAST 12 MONTHS, WAS THERE A TIME WHEN YOU DID NOT HAVE A STEADY PLACE TO SLEEP OR SLEPT IN A SHELTER (INCLUDING NOW)?: YES

## 2024-11-05 SDOH — HEALTH STABILITY: MENTAL HEALTH
STRESS IS WHEN SOMEONE FEELS TENSE, NERVOUS, ANXIOUS, OR CAN'T SLEEP AT NIGHT BECAUSE THEIR MIND IS TROUBLED. HOW STRESSED ARE YOU?: VERY MUCH

## 2024-11-05 ASSESSMENT — LIFESTYLE VARIABLES
AUDIT-C TOTAL SCORE: 0
SKIP TO QUESTIONS 9-10: 1
HOW MANY STANDARD DRINKS CONTAINING ALCOHOL DO YOU HAVE ON A TYPICAL DAY: PATIENT DOES NOT DRINK
HOW OFTEN DO YOU HAVE A DRINK CONTAINING ALCOHOL: NEVER
HOW OFTEN DO YOU HAVE SIX OR MORE DRINKS ON ONE OCCASION: NEVER

## 2024-11-05 ASSESSMENT — SOCIAL DETERMINANTS OF HEALTH (SDOH)
HOW OFTEN DO YOU ATTENT MEETINGS OF THE CLUB OR ORGANIZATION YOU BELONG TO?: 1 TO 4 TIMES PER YEAR
IN A TYPICAL WEEK, HOW MANY TIMES DO YOU TALK ON THE PHONE WITH FAMILY, FRIENDS, OR NEIGHBORS?: ONCE A WEEK
IN THE PAST 12 MONTHS, HAS THE ELECTRIC, GAS, OIL, OR WATER COMPANY THREATENED TO SHUT OFF SERVICE IN YOUR HOME?: YES
DO YOU BELONG TO ANY CLUBS OR ORGANIZATIONS SUCH AS CHURCH GROUPS UNIONS, FRATERNAL OR ATHLETIC GROUPS, OR SCHOOL GROUPS?: YES
HOW MANY DRINKS CONTAINING ALCOHOL DO YOU HAVE ON A TYPICAL DAY WHEN YOU ARE DRINKING: PATIENT DOES NOT DRINK
ARE YOU MARRIED, WIDOWED, DIVORCED, SEPARATED, NEVER MARRIED, OR LIVING WITH A PARTNER?: NEVER MARRIED
HOW OFTEN DO YOU ATTEND CHURCH OR RELIGIOUS SERVICES?: NEVER
WITHIN THE PAST 12 MONTHS, YOU WORRIED THAT YOUR FOOD WOULD RUN OUT BEFORE YOU GOT THE MONEY TO BUY MORE: SOMETIMES TRUE
HOW OFTEN DO YOU ATTENT MEETINGS OF THE CLUB OR ORGANIZATION YOU BELONG TO?: 1 TO 4 TIMES PER YEAR
HOW OFTEN DO YOU HAVE SIX OR MORE DRINKS ON ONE OCCASION: NEVER
HOW HARD IS IT FOR YOU TO PAY FOR THE VERY BASICS LIKE FOOD, HOUSING, MEDICAL CARE, AND HEATING?: HARD
HOW OFTEN DO YOU HAVE A DRINK CONTAINING ALCOHOL: NEVER
DO YOU BELONG TO ANY CLUBS OR ORGANIZATIONS SUCH AS CHURCH GROUPS UNIONS, FRATERNAL OR ATHLETIC GROUPS, OR SCHOOL GROUPS?: YES
IN A TYPICAL WEEK, HOW MANY TIMES DO YOU TALK ON THE PHONE WITH FAMILY, FRIENDS, OR NEIGHBORS?: ONCE A WEEK
HOW OFTEN DO YOU ATTEND CHURCH OR RELIGIOUS SERVICES?: NEVER
HOW OFTEN DO YOU GET TOGETHER WITH FRIENDS OR RELATIVES?: NEVER
HOW OFTEN DO YOU GET TOGETHER WITH FRIENDS OR RELATIVES?: NEVER
ARE YOU MARRIED, WIDOWED, DIVORCED, SEPARATED, NEVER MARRIED, OR LIVING WITH A PARTNER?: NEVER MARRIED

## 2024-11-08 ENCOUNTER — NON-PROVIDER VISIT (OUTPATIENT)
Dept: INTERNAL MEDICINE | Facility: OTHER | Age: 54
End: 2024-11-08
Payer: MEDICAID

## 2024-11-08 DIAGNOSIS — E66.01 MORBID OBESITY (HCC): ICD-10-CM

## 2024-11-08 PROCEDURE — 97802 MEDICAL NUTRITION INDIV IN: CPT | Performed by: DIETITIAN, REGISTERED

## 2024-11-08 NOTE — PROGRESS NOTES
Eric is a 54 y.o. male here for nutrition counseling/dietitian assessment for weight management (dx of E66.01)    Gained 100# after 4 years of dealing with COVID - in bed a lot and eating simple sugar foods    In his 20's he was 200#   Then 225# until 2007 and gained 50# after his brother committed suicide     Lives with his 82 y.o. mother  He is cooking for him and mother and making healthy foods    Reports he is losing some weight  Working 4 hours (5am) at [employer] in the morning - lost 18 lbs intentionally with being more active with work and a better diet   Tries to not snack between meals   Used to drink a lot of soda (4L per day)    24 hour recall:   B - eggs or sausage/hernandez or pancakes, crystal light    L - sausages, ramen noodles, sandwiches   D - frozen pizza with salad, blue cheese dressing   S - left overs from dinner     Beverages: 2 soda bottles per day (Cola) 2x/week, crystal light, not much juice anymore, milk with cereal,   Alcohol: none     Physical activity: not much due to being sick the last 4 years  Wears a fitbit he will do 5-10k steps per day     PES: Obesity RT excessive calorie intake and limited exercise AEB a current BMI of 45.65    It was a pleasure meeting Eric today and he expressed understanding of the contributors to his weight gain but seems very motivated to make lifestyle changes to support him with losing weight. Supported him with finding areas he was willing and confident in taking on right now to help him with continued weight loss as he has started to already see his weight come down. Set goals around those areas; RTC with RD in 1-3 months    Time spent: 60 minutes

## 2024-11-17 VITALS — BODY MASS INDEX: 45.65 KG/M2 | WEIGHT: 315 LBS

## 2024-11-17 ASSESSMENT — FIBROSIS 4 INDEX: FIB4 SCORE: 1.36

## 2024-11-27 DIAGNOSIS — E55.9 VITAMIN D DEFICIENCY: ICD-10-CM

## 2024-11-27 NOTE — TELEPHONE ENCOUNTER
Received request via: Pharmacy    Was the patient seen in the last year in this department? Yes    Does the patient have an active prescription (recently filled or refills available) for medication(s) requested? No    Pharmacy Name:   [facility]/pharmacy #[id number] - [place], [place] - [address]  [address]  [place] [address]  Phone: [phone] Fax: [phone]    Does the patient have [facility] and need 100-day supply? (This applies to ALL medications) Patient does not have SCP

## 2024-12-02 DIAGNOSIS — E55.9 VITAMIN D DEFICIENCY: ICD-10-CM

## 2024-12-02 RX ORDER — MULTIVIT-MIN/IRON/FOLIC ACID/K 18-600-40
1000 CAPSULE ORAL DAILY
Qty: 90 TABLET | Refills: 1 | Status: SHIPPED | OUTPATIENT
Start: 2024-12-02

## 2024-12-02 RX ORDER — CHOLECALCIFEROL (VITAMIN D3) 25 MCG
1000 TABLET ORAL
Qty: 30 TABLET | Refills: 5 | OUTPATIENT
Start: 2024-12-02

## 2024-12-05 ENCOUNTER — OFFICE VISIT (OUTPATIENT)
Dept: INTERNAL MEDICINE | Facility: OTHER | Age: 54
End: 2024-12-05
Payer: MEDICAID

## 2024-12-05 VITALS
HEIGHT: 77 IN | HEART RATE: 80 BPM | DIASTOLIC BLOOD PRESSURE: 76 MMHG | OXYGEN SATURATION: 93 % | TEMPERATURE: 97 F | WEIGHT: 315 LBS | BODY MASS INDEX: 37.19 KG/M2 | SYSTOLIC BLOOD PRESSURE: 135 MMHG

## 2024-12-05 DIAGNOSIS — F41.9 ANXIETY AND DEPRESSION: ICD-10-CM

## 2024-12-05 DIAGNOSIS — E66.813 CLASS 3 SEVERE OBESITY DUE TO EXCESS CALORIES WITH BODY MASS INDEX (BMI) OF 45.0 TO 49.9 IN ADULT, UNSPECIFIED WHETHER SERIOUS COMORBIDITY PRESENT (HCC): ICD-10-CM

## 2024-12-05 DIAGNOSIS — E66.01 CLASS 3 SEVERE OBESITY DUE TO EXCESS CALORIES WITH BODY MASS INDEX (BMI) OF 45.0 TO 49.9 IN ADULT, UNSPECIFIED WHETHER SERIOUS COMORBIDITY PRESENT (HCC): ICD-10-CM

## 2024-12-05 DIAGNOSIS — F13.20 BENZODIAZEPINE DEPENDENCE, CONTINUOUS (HCC): ICD-10-CM

## 2024-12-05 DIAGNOSIS — F32.A ANXIETY AND DEPRESSION: ICD-10-CM

## 2024-12-05 PROCEDURE — 3075F SYST BP GE 130 - 139MM HG: CPT | Mod: GC

## 2024-12-05 PROCEDURE — 99213 OFFICE O/P EST LOW 20 MIN: CPT | Mod: GE

## 2024-12-05 PROCEDURE — 3078F DIAST BP <80 MM HG: CPT | Mod: GC

## 2024-12-05 RX ORDER — CITALOPRAM HYDROBROMIDE 40 MG/1
40 TABLET ORAL
Qty: 90 TABLET | Refills: 3 | Status: SHIPPED | OUTPATIENT
Start: 2024-12-05

## 2024-12-05 ASSESSMENT — ENCOUNTER SYMPTOMS
DEPRESSION: 1
NERVOUS/ANXIOUS: 1
COUGH: 0
WEIGHT LOSS: 1

## 2024-12-05 ASSESSMENT — LIFESTYLE VARIABLES: SUBSTANCE_ABUSE: 0

## 2024-12-05 ASSESSMENT — FIBROSIS 4 INDEX: FIB4 SCORE: 1.36

## 2024-12-05 NOTE — ASSESSMENT & PLAN NOTE
Longstanding history of, related to suicide of his brother in 2007. Reports has been on citalopram and lorazepam nightly since then. Was followed by psychiatry until now ([facility]up[facility]), too expensive due to insurance change. No thoughts of self harm, feels symptoms are well controlled. Reports willingness to trial Ativan taper     -Refill Celexa  -Reduce Ativan from 1 mg to 0.5 mg nightly   -Referral to establish with new psychiatrist, behavioral health  -Follow up on response at next visit  -Continue engaging in activities that help with mood such as work, volunteering

## 2024-12-05 NOTE — PROGRESS NOTES
Established Patient    Patient Care Team:  Jocelin Woodson M.D. as PCP - General (Internal Medicine)    Eric Fitzgerald is a 54 y.o. male who presents today with the following Chief Complaint(s): Follow up for Diagnoses of Anxiety and depression, Class 3 severe obesity due to excess calories with body mass index (BMI) of 45.0 to 49.9 in adult, unspecified whether serious comorbidity present (HCC), and Benzodiazepine dependence, continuous (HCC) were pertinent to this visit.    HPI:  Eric Fitzgerald is a 53 YO M with a PMHx anxiety and depression, severe obesity, HLD last seen by me in October to discuss subacute cough persisting after doxycycline and Medrol Dosepak from urgent care for URI with asthma exacerbation concern. Procal and CXR were ordered which were normal. Since then he worked with Candice Benson RD and he planned to increase his daily steps. Today he returns to clinic to discuss his anxiety and depression management:    -Anxiety and depression: all started in 2007 when his brother committed suicide and was started on citalopram and lorazepam. Had been following with psychiatry (Dr. Roe) for many years but insurance changed and now cannot afford psychiatry. If it weren't for these medicines he would be much worse off. He uses the lorazepam nightly. He feels like he may be able to taper off this     -Weight: He really likes Candice Benson. He is having success with her recommendations. Next appointment is in February. He notices his clothes are fitting looser. Weight is down 10 pounds.     -Cough: resolved     -Colonoscopy: not yet scheduled     Review of Systems   Constitutional:  Positive for weight loss (intentional). Negative for malaise/fatigue.   Respiratory:  Negative for cough.    Psychiatric/Behavioral:  Positive for depression. Negative for substance abuse and suicidal ideas. The patient is nervous/anxious.        Past Medical History:   Diagnosis Date    Anxiety     Asthma  "05/14/2024    Depression     Fatigue 05/14/2024       Social History     Tobacco Use    Smoking status: Never    Smokeless tobacco: Never   Vaping Use    Vaping status: Never Used   Substance Use Topics    Alcohol use: Not Currently     Comment: occasional, rare    Drug use: Not Currently       Current Outpatient Medications   Medication Sig Dispense Refill    citalopram (CELEXA) 40 MG Tab Take 1 Tablet by mouth every day. 90 Tablet 3    Vitamin D, Cholecalciferol, (CHOLECALCIFEROL) 25 MCG (1000 UT) Tab Take 1 Tablet by mouth every day. 90 Tablet 1    multivitamin Tab Take 1 Tablet by mouth every day.      Ascorbic Acid (VITAMIN C PO) Take  by mouth.      B Complex Vitamins (VITAMIN B COMPLEX PO) Take  by mouth.      Fexofenadine HCl (ALLEGRA ALLERGY PO) Take  by mouth.      albuterol 108 (90 Base) MCG/ACT Aero Soln inhalation aerosol Inhale 2 Puffs every 6 hours as needed for Shortness of Breath.      LORazepam (ATIVAN) 1 MG Tab Take 0.5 mg by mouth every evening. Indications: Feeling Anxious       No current facility-administered medications for this visit.         /76 (BP Location: Left arm, Patient Position: Sitting, BP Cuff Size: Large adult)   Pulse 80   Temp 36.1 °C (97 °F) (Temporal)   Ht 1.956 m (6' 5\")   Wt (!) 176 kg (387 lb 9.6 oz)   SpO2 93%   BMI 45.96 kg/m²     Physical Exam  Vitals reviewed.   Constitutional:       General: He is not in acute distress.     Appearance: Normal appearance. He is obese. He is not ill-appearing, toxic-appearing or diaphoretic.   HENT:      Head: Normocephalic and atraumatic.   Cardiovascular:      Rate and Rhythm: Normal rate and regular rhythm.      Heart sounds: No murmur heard.  Pulmonary:      Effort: Pulmonary effort is normal. No respiratory distress.      Breath sounds: No stridor. No wheezing.   Musculoskeletal:      Right lower leg: No edema.      Left lower leg: No edema.   Skin:     Findings: No erythema.   Neurological:      Mental Status: He is " alert.   Psychiatric:         Mood and Affect: Mood normal.         Behavior: Behavior normal.         Thought Content: Thought content normal.           Assessment and Plan:     Anxiety and depression  Longstanding history of, related to suicide of his brother in 2007. Reports has been on citalopram and lorazepam nightly since then. Was followed by psychiatry until now (Bhupinder), too expensive due to insurance change. No thoughts of self harm, feels symptoms are well controlled. Reports willingness to trial Ativan taper     -Refill Celexa  -Reduce Ativan from 1 mg to 0.5 mg nightly   -Referral to establish with new psychiatrist, behavioral health  -Follow up on response at next visit  -Continue engaging in activities that help with mood such as work, volunteering    Benzodiazepine dependence, continuous ([facility])  Has been on Ativan for years via psychiatry for anxiety/insomnia.      -Trial Ativan taper 1 mg nightly  -> 0.5 mg nightly   -At next visit reevaluate ability to continue further taper  -Psychiatry referral (needs within network)    Class 3 severe obesity due to excess calories with body mass index (BMI) of 45.0 to 49.9 in adult ([facility])  Weight today 387 lbs BMI 45.96 compared to 397 lbs BMI 47.69 in May 2024. Working with nutrition services which he finds helpful. Not remarkably physically active but feeling physically and mentally improved. Concomitant HLD but no diabetes or HTN.     -Continue follow up with nutrition   -Congratulated on weight loss efforts        Orders Placed This Encounter    Referral to Behavioral Health    citalopram (CELEXA) 40 MG Tab           Jocelin Woodson M.D. PGY III  [facility] [employer]

## 2024-12-05 NOTE — ASSESSMENT & PLAN NOTE
Weight today 387 lbs BMI 45.96 compared to 397 lbs BMI 47.69 in May 2024. Working with nutrition services which he finds helpful. Not remarkably physically active but feeling physically and mentally improved. Concomitant HLD but no diabetes or HTN.     -Continue follow up with nutrition   -Congratulated on weight loss efforts

## 2024-12-05 NOTE — ASSESSMENT & PLAN NOTE
Has been on Ativan for years via psychiatry for anxiety/insomnia.      -Trial Ativan taper 1 mg nightly  -> 0.5 mg nightly   -At next visit reevaluate ability to continue further taper  -Psychiatry referral (needs within network)

## 2024-12-05 NOTE — PATIENT INSTRUCTIONS
Make your appointment for [facility] with [facility]   Cut your Ativan in half to take nightly  I have referred you to psychiatry and therapy. If you do not hear back either via MyChart or phone call within the next week with referral scheduling details, please call or send a [employer] message for assistance.

## 2024-12-10 ENCOUNTER — OFFICE VISIT (OUTPATIENT)
Dept: INTERNAL MEDICINE | Facility: OTHER | Age: 54
End: 2024-12-10
Payer: MEDICAID

## 2024-12-10 VITALS
HEART RATE: 70 BPM | WEIGHT: 315 LBS | BODY MASS INDEX: 38.36 KG/M2 | TEMPERATURE: 97 F | HEIGHT: 76 IN | SYSTOLIC BLOOD PRESSURE: 123 MMHG | DIASTOLIC BLOOD PRESSURE: 89 MMHG | OXYGEN SATURATION: 98 %

## 2024-12-10 DIAGNOSIS — R00.2 PALPITATIONS: ICD-10-CM

## 2024-12-10 DIAGNOSIS — R29.90 EPISODE OF TRANSIENT NEUROLOGIC SYMPTOMS: ICD-10-CM

## 2024-12-10 PROCEDURE — 99214 OFFICE O/P EST MOD 30 MIN: CPT | Mod: GC

## 2024-12-10 PROCEDURE — 3079F DIAST BP 80-89 MM HG: CPT | Mod: GC

## 2024-12-10 PROCEDURE — 3074F SYST BP LT 130 MM HG: CPT | Mod: GC

## 2024-12-10 RX ORDER — ASPIRIN 81 MG/1
81 TABLET, CHEWABLE ORAL DAILY
Qty: 60 TABLET | Refills: 0 | Status: SHIPPED | OUTPATIENT
Start: 2024-12-10

## 2024-12-10 RX ORDER — ASPIRIN 81 MG/1
81 TABLET, CHEWABLE ORAL DAILY
Qty: 30 TABLET | Refills: 0 | Status: SHIPPED | OUTPATIENT
Start: 2024-12-10 | End: 2024-12-10

## 2024-12-10 RX ORDER — ATORVASTATIN CALCIUM 40 MG/1
40 TABLET, FILM COATED ORAL NIGHTLY
Qty: 30 TABLET | Refills: 0 | Status: SHIPPED | OUTPATIENT
Start: 2024-12-10 | End: 2024-12-10

## 2024-12-10 RX ORDER — ATORVASTATIN CALCIUM 40 MG/1
40 TABLET, FILM COATED ORAL NIGHTLY
Qty: 60 TABLET | Refills: 0 | Status: SHIPPED | OUTPATIENT
Start: 2024-12-10

## 2024-12-10 ASSESSMENT — ENCOUNTER SYMPTOMS
DIZZINESS: 0
SPEECH CHANGE: 0
SENSORY CHANGE: 0
DOUBLE VISION: 0
WEAKNESS: 0
TINGLING: 0
FALLS: 0
HEADACHES: 0

## 2024-12-10 ASSESSMENT — FIBROSIS 4 INDEX: FIB4 SCORE: 1.36

## 2024-12-10 NOTE — PATIENT INSTRUCTIONS
Get your MRI brain scheduled  I have ordered you a heart monitor patch. Please get this set up (will receive a [employer] message  Start taking aspirin and atorvastatin while awaiting your MRI   If your symptoms return please let me know and/or seek urgent medical attention such as calling 911

## 2024-12-10 NOTE — ASSESSMENT & PLAN NOTE
Intermittent, chronic. Bothersome to patient when happens. Regular cardiac exam    -Ziopatch 14 days ordered to rule out arrhythmia

## 2024-12-10 NOTE — ASSESSMENT & PLAN NOTE
Unclear etiology given short lived episode of double vision with sensation of facial drooping but no actual drooping, no other neurological concerns and spontaneously resolved. Associated with some palpitations. ASCVD risk score 4.4%. could be a TIA but less likely given that the double vision lasted only 10-15 seconds. Unlikely panic attack given no anxiety during or preceding episode. Was well hydrated, no new meds.     -Ziopatch to rule out arrhythmia   -Aspirin and statin while awaiting MRI. Will discontinue if normal  -ER precautions given

## 2024-12-10 NOTE — PROGRESS NOTES
"    Established Patient    Patient Care Team:  Jocelin Woodson M.D. as PCP - General (Internal Medicine)    Eric Fitzgerald is a 54 y.o. male who presents today with the following Chief Complaint(s): Follow up for Diagnoses of Palpitations and Episode of transient neurologic symptoms were pertinent to this visit.    HPI:  Eric Fitzgerald is a 55 YO M with a PMHx anxiety and depression, severe obesity, HLD last seen by me last week at which time he was referred to new psychiatrist and behavioral health, he was counseled on tapering his lorazepam by cutting it in half at night, and his Celexa was refilled.     Today he presents virtually for acute visit to discuss an episode- Sunday night he was taking his medications and had acute onset double vision that lasted 10-15 seconds. When his vision cleared he felt a \"weird\" sensation in his right face. He had no drooping of his face, no speech changes. He was a bit dizzy and lightheaded and then went to lay down. He had some possible palpitations. He can't explain the \"weird\" sensation; no tingling. He felt well again yesterday however, back to his normal. Was not feeling anxious at the time. Reports his dad had a small TIA around this age.     No new OTC products, was staying well-hydrated.     Review of Systems   Eyes:  Negative for double vision.   Musculoskeletal:  Negative for falls.   Neurological:  Negative for dizziness, tingling, sensory change, speech change, weakness and headaches.       Past Medical History:   Diagnosis Date    Anxiety     Asthma 05/14/2024    Depression     Fatigue 05/14/2024       Social History     Tobacco Use    Smoking status: Never    Smokeless tobacco: Never   Vaping Use    Vaping status: Never Used   Substance Use Topics    Alcohol use: Not Currently     Comment: occasional, rare    Drug use: Not Currently       Current Outpatient Medications   Medication Sig Dispense Refill    aspirin (ASA) 81 MG Chew Tab chewable tablet Chew 1 " "Tablet every day. 60 Tablet 0    atorvastatin (LIPITOR) 40 MG Tab Take 1 Tablet by mouth every evening. 60 Tablet 0    citalopram (CELEXA) 40 MG Tab Take 1 Tablet by mouth every day. 90 Tablet 3    Vitamin D, Cholecalciferol, (CHOLECALCIFEROL) 25 MCG (1000 UT) Tab Take 1 Tablet by mouth every day. 90 Tablet 1    multivitamin Tab Take 1 Tablet by mouth every day.      Ascorbic Acid (VITAMIN C PO) Take  by mouth.      B Complex Vitamins (VITAMIN B COMPLEX PO) Take  by mouth.      Fexofenadine HCl (ALLEGRA ALLERGY PO) Take  by mouth.      albuterol 108 (90 Base) MCG/ACT Aero Soln inhalation aerosol Inhale 2 Puffs every 6 hours as needed for Shortness of Breath.      LORazepam (ATIVAN) 1 MG Tab Take 0.5 mg by mouth every evening. Indications: Feeling Anxious       No current facility-administered medications for this visit.           /89 (BP Location: Left arm, Patient Position: Sitting, BP Cuff Size: Large adult)   Pulse 70   Temp 36.1 °C (97 °F)   Ht 1.93 m (6' 4\")   Wt (!) 174 kg (383 lb)   SpO2 98%   BMI 46.62 kg/m²     Physical Exam  Vitals reviewed.   Constitutional:       General: He is not in acute distress.     Appearance: He is obese. He is not ill-appearing, toxic-appearing or diaphoretic.   Cardiovascular:      Rate and Rhythm: Normal rate.   Pulmonary:      Effort: Pulmonary effort is normal.   Neurological:      General: No focal deficit present.      Mental Status: He is alert and oriented to person, place, and time.      Cranial Nerves: No cranial nerve deficit.      Sensory: No sensory deficit.      Motor: No weakness.      Coordination: Coordination normal.      Gait: Gait normal.   Psychiatric:         Mood and Affect: Mood normal.         Behavior: Behavior normal.           Assessment and Plan:     Episode of transient neurologic symptoms  Unclear etiology given short lived episode of double vision with sensation of facial drooping but no actual drooping, no other neurological concerns " and spontaneously resolved. Associated with some palpitations. ASCVD risk score 4.4%. could be a TIA but less likely given that the double vision lasted only 10-15 seconds. Unlikely panic attack given no anxiety during or preceding episode. Was well hydrated, no new meds.     -Ziopatch to rule out arrhythmia   -Aspirin and statin while awaiting MRI. Will discontinue if normal  -ER precautions given     Palpitations  Intermittent, chronic. Bothersome to patient when happens. Regular cardiac exam    -Ziopatch 14 days ordered to rule out arrhythmia        Orders Placed This Encounter    MR-BRAIN-WITH & W/O    Cardiac Event Monitor    DISCONTD: aspirin (ASA) 81 MG Chew Tab chewable tablet    DISCONTD: atorvastatin (LIPITOR) 40 MG Tab    aspirin (ASA) 81 MG Chew Tab chewable tablet    atorvastatin (LIPITOR) 40 MG Tab           Jocelin Woodson M.D. PGY III  [facility] [employer]

## 2024-12-18 ENCOUNTER — TELEPHONE (OUTPATIENT)
Dept: SCHEDULING | Facility: IMAGING CENTER | Age: 54
End: 2024-12-18
Payer: MEDICAID

## 2025-01-02 DIAGNOSIS — R29.90 EPISODE OF TRANSIENT NEUROLOGIC SYMPTOMS: ICD-10-CM

## 2025-01-02 RX ORDER — ATORVASTATIN CALCIUM 40 MG/1
40 TABLET, FILM COATED ORAL NIGHTLY
Qty: 90 TABLET | Refills: 3 | Status: SHIPPED | OUTPATIENT
Start: 2025-01-02

## 2025-01-03 ENCOUNTER — NON-PROVIDER VISIT (OUTPATIENT)
Dept: CARDIOLOGY | Facility: MEDICAL CENTER | Age: 55
End: 2025-01-03
Payer: MEDICAID

## 2025-01-03 DIAGNOSIS — R00.2 PALPITATIONS: ICD-10-CM

## 2025-01-03 DIAGNOSIS — R29.90 EPISODE OF TRANSIENT NEUROLOGIC SYMPTOMS: ICD-10-CM

## 2025-01-03 NOTE — PROGRESS NOTES
Home enrollment completed in the 14 day Zio Holter monitor per Jocelin Woodson MD.  Monitor will be shipped to patient via [employer].  Pending EOS.

## 2025-01-23 ENCOUNTER — OFFICE VISIT (OUTPATIENT)
Dept: INTERNAL MEDICINE | Facility: OTHER | Age: 55
End: 2025-01-23
Payer: MEDICAID

## 2025-01-23 VITALS
SYSTOLIC BLOOD PRESSURE: 120 MMHG | DIASTOLIC BLOOD PRESSURE: 82 MMHG | BODY MASS INDEX: 38.36 KG/M2 | TEMPERATURE: 97.8 F | HEIGHT: 76 IN | OXYGEN SATURATION: 96 % | WEIGHT: 315 LBS | HEART RATE: 70 BPM

## 2025-01-23 DIAGNOSIS — Z91.89 AT RISK FOR SLEEP APNEA: ICD-10-CM

## 2025-01-23 DIAGNOSIS — F41.9 ANXIETY AND DEPRESSION: ICD-10-CM

## 2025-01-23 DIAGNOSIS — E78.00 PURE HYPERCHOLESTEROLEMIA: ICD-10-CM

## 2025-01-23 DIAGNOSIS — E55.9 VITAMIN D DEFICIENCY: ICD-10-CM

## 2025-01-23 DIAGNOSIS — F32.A ANXIETY AND DEPRESSION: ICD-10-CM

## 2025-01-23 PROCEDURE — 99214 OFFICE O/P EST MOD 30 MIN: CPT | Mod: GC

## 2025-01-23 PROCEDURE — 1126F AMNT PAIN NOTED NONE PRSNT: CPT | Mod: GC

## 2025-01-23 PROCEDURE — 3074F SYST BP LT 130 MM HG: CPT | Mod: GC

## 2025-01-23 PROCEDURE — 3079F DIAST BP 80-89 MM HG: CPT | Mod: GC

## 2025-01-23 ASSESSMENT — LIFESTYLE VARIABLES: SUBSTANCE_ABUSE: 0

## 2025-01-23 ASSESSMENT — ENCOUNTER SYMPTOMS
NERVOUS/ANXIOUS: 1
INSOMNIA: 1
DEPRESSION: 1

## 2025-01-23 ASSESSMENT — FIBROSIS 4 INDEX: FIB4 SCORE: 1.36

## 2025-01-23 ASSESSMENT — PATIENT HEALTH QUESTIONNAIRE - PHQ9: CLINICAL INTERPRETATION OF PHQ2 SCORE: 0

## 2025-01-23 ASSESSMENT — PAIN SCALES - GENERAL: PAINLEVEL_OUTOF10: NO PAIN

## 2025-01-23 NOTE — ASSESSMENT & PLAN NOTE
May 2024 lipid panel with , HDL 52, . ASCVD risk score 4.4%; no statin recommended however in the setting of his possible TIA (pending MRI) this was ordered last visit. Was not taking it due to palpitations though, patient thought was a side effect.     -Counseled on taking the medication at least until MRI is done; if TIA ruled out can discontinue it

## 2025-01-23 NOTE — ASSESSMENT & PLAN NOTE
Discovered on labs done for fatigue last year. Does not have much sun exposure, trying to optimize diet. Taking vitamin D 1000 u daily    -Repeat vitamin D level

## 2025-01-23 NOTE — PATIENT INSTRUCTIONS
Try to cut your lorazepam again in half   Start taking your atorvastatin until your MRI is done (and normal)  Get your labs done  I have referred you to sleep medicine for sleep study. If you do not hear back either via MyChart or phone call within the next week with referral scheduling details, please call or send a [employer] message for assistance.    Follow up with your psychiatrist regarding your medications for anxiety/depression

## 2025-01-23 NOTE — PROGRESS NOTES
Established Patient    Patient Care Team:  Jocelin Woodson M.D. as PCP - General (Internal Medicine)    Eric Fitzgerald is a 54 y.o. male who presents today with the following Chief Complaint(s): Follow up for Diagnoses of Pure hypercholesterolemia, Vitamin D deficiency, Anxiety and depression, and At risk for sleep apnea were pertinent to this visit.    HPI:  Eric Fitzgerald is a 53 yo M with a PMHx severe obesity, anxiety and depression, palpitations last seen by me in December in two separate visits over which we referred him to behavioral health to establish with a new psychiatrist and we reduced his lorazepam (prescribed by psychiatry for many years) from 1 mg to 0.5 mg. He came back to discuss palpitations and an episode of face sensory changes and dizziness that was brief and self resolved but given risk factors for TIA an MRI brain was ordered (radiology appt scheduled at the end of this month). Today he returns to clinic to discuss:       -Referral to psychiatry: he had a video visit with a new psychiatrist but is unsure of the provider details though it is a provider on [address] (appointment was in December). Trazodone was prescribed but he is not taking it because he was unsure about side effects with lorazepam. Thus he is still taking the Celexa. Lorazepam weaning has not been successful due to having extra stress from his car breaking down. He would however like to try cutting down again. He has a follow up visit next week with the psychiatrist. Currently does feel like mood is well controlled.    -Atorvastatin: reports he had palpitations when he first started taking it so did not continue it. He was under the stress of his car breaking down around this same time. He does feel he can give it another try. He has not had another similar episode of the dizziness/sensory changes. Ziopatch has been received by him but he has not yet started using it due to getting his MRI and fear for the  metal being an issue.     Of note he reports his psychiatrist wanted him to get a sleep study. He did have a mild sleep apnea finding years ago with home sleep study. STOPBANG score of 6.     He has a nutritionist appointment with Candice Benson next month.       Review of Systems   Psychiatric/Behavioral:  Positive for depression. Negative for substance abuse and suicidal ideas. The patient is nervous/anxious and has insomnia.          Past Medical History:   Diagnosis Date    Anxiety     Asthma 05/14/2024    Depression     Fatigue 05/14/2024       Social History     Tobacco Use    Smoking status: Never    Smokeless tobacco: Never   Vaping Use    Vaping status: Never Used   Substance Use Topics    Alcohol use: Not Currently     Comment: occasional, rare    Drug use: Not Currently       Current Outpatient Medications   Medication Sig Dispense Refill    aspirin (ASA) 81 MG Chew Tab chewable tablet Chew 1 Tablet every day. 60 Tablet 0    citalopram (CELEXA) 40 MG Tab Take 1 Tablet by mouth every day. 90 Tablet 3    Vitamin D, Cholecalciferol, (CHOLECALCIFEROL) 25 MCG (1000 UT) Tab Take 1 Tablet by mouth every day. 90 Tablet 1    multivitamin Tab Take 1 Tablet by mouth every day.      Ascorbic Acid (VITAMIN C PO) Take  by mouth.      B Complex Vitamins (VITAMIN B COMPLEX PO) Take  by mouth.      Fexofenadine HCl (ALLEGRA ALLERGY PO) Take  by mouth.      albuterol 108 (90 Base) MCG/ACT Aero Soln inhalation aerosol Inhale 2 Puffs every 6 hours as needed for Shortness of Breath.      LORazepam (ATIVAN) 1 MG Tab Take 0.5 mg by mouth every evening. Indications: Feeling Anxious      atorvastatin (LIPITOR) 40 MG Tab Take 1 Tablet by mouth every evening. (Patient not taking: Reported on 1/23/2025) 90 Tablet 3     No current facility-administered medications for this visit.       /82 (BP Location: Left arm, Patient Position: Sitting, BP Cuff Size: Large adult long)   Pulse 70   Temp 36.6 °C (97.8 °F) (Temporal)   Ht  "1.93 m (6' 4\")   Wt (!) 173 kg (381 lb 6.4 oz)   SpO2 96%   BMI 46.43 kg/m²     Physical Exam  Vitals reviewed.   Constitutional:       General: He is not in acute distress.     Appearance: He is obese. He is not ill-appearing, toxic-appearing or diaphoretic.   HENT:      Head: Normocephalic and atraumatic.   Eyes:      Extraocular Movements: Extraocular movements intact.   Cardiovascular:      Rate and Rhythm: Normal rate.   Pulmonary:      Effort: Pulmonary effort is normal.   Neurological:      General: No focal deficit present.      Mental Status: He is alert and oriented to person, place, and time.      Gait: Gait normal.   Psychiatric:         Mood and Affect: Mood normal.         Behavior: Behavior normal.         Thought Content: Thought content normal.         Judgment: Judgment normal.           Assessment and Plan:     Anxiety and depression  Longstanding history of, related to suicide of his brother in 2007. Has been on citalopram and lorazepam nightly since then. Was followed by psychiatry (Bhupinder), but with insurance change was too expensive. At our last visit discussed tapering lorazepam and referred to psychiatry for new establishment. No thoughts of self harm, feels symptoms are well controlled. Reports willingness to trial Ativan taper      -Continue Celexa until seen again by new psychiatrist  -Reduce Ativan from 1 mg to 0.5 mg nightly   -Continue engaging in activities that help with mood such as work, volunteering       Pure hypercholesterolemia  May 2024 lipid panel with , HDL 52, . ASCVD risk score 4.4%; no statin recommended however in the setting of his possible TIA (pending MRI) this was ordered last visit. Was not taking it due to palpitations though, patient thought was a side effect.     -Counseled on taking the medication at least until MRI is done; if TIA ruled out can discontinue it       Vitamin D deficiency  Discovered on labs done for fatigue last year. Does not " have much sun exposure, trying to optimize diet. Taking vitamin D 1000 u daily    -Repeat vitamin D level     At risk for sleep apnea  STOPBANG score of 6    -Referral to sleep medicine and home sleep study ordered        Orders Placed This Encounter    Overnight Home Sleep Study    VITAMIN D,25 HYDROXY (DEFICIENCY)    Referral to Pulmonary and Sleep Medicine           Jocelin Woodson M.D. PGY III  [facility] [employer]

## 2025-01-23 NOTE — ASSESSMENT & PLAN NOTE
Longstanding history of, related to suicide of his brother in 2007. Has been on citalopram and lorazepam nightly since then. Was followed by psychiatry (Bhupinder), but with insurance change was too expensive. At our last visit discussed tapering lorazepam and referred to psychiatry for new establishment. No thoughts of self harm, feels symptoms are well controlled. Reports willingness to trial Ativan taper      -Continue Celexa until seen again by new psychiatrist  -Reduce Ativan from 1 mg to 0.5 mg nightly   -Continue engaging in activities that help with mood such as work, volunteering

## 2025-01-29 ENCOUNTER — APPOINTMENT (OUTPATIENT)
Dept: RADIOLOGY | Facility: MEDICAL CENTER | Age: 55
End: 2025-01-29
Payer: MEDICAID

## 2025-01-29 DIAGNOSIS — R29.90 EPISODE OF TRANSIENT NEUROLOGIC SYMPTOMS: ICD-10-CM

## 2025-01-29 PROCEDURE — 70553 MRI BRAIN STEM W/O & W/DYE: CPT

## 2025-01-29 PROCEDURE — A9579 GAD-BASE MR CONTRAST NOS,1ML: HCPCS | Mod: JZ,UD

## 2025-01-29 PROCEDURE — 700117 HCHG RX CONTRAST REV CODE 255: Mod: JZ,UD

## 2025-01-29 RX ADMIN — GADOTERIDOL 20 ML: 279.3 INJECTION, SOLUTION INTRAVENOUS at 11:04

## 2025-02-07 DIAGNOSIS — F32.A ANXIETY AND DEPRESSION: ICD-10-CM

## 2025-02-07 DIAGNOSIS — F41.9 ANXIETY AND DEPRESSION: ICD-10-CM

## 2025-02-07 RX ORDER — LORAZEPAM 1 MG/1
0.5 TABLET ORAL NIGHTLY
Qty: 3 TABLET | Refills: 0 | Status: CANCELLED | OUTPATIENT
Start: 2025-02-07 | End: 2025-02-13

## 2025-02-07 NOTE — TELEPHONE ENCOUNTER
Received request via: Patient  Only needs 6 pills to last till aptt with behavioral health     Was the patient seen in the last year in this department? Yes    Does the patient have an active prescription (recently filled or refills available) for medication(s) requested? No    Pharmacy Name:   [facility]/pharmacy #[id number] - [place], [place] - [address]  [address]  [place] [address]  Phone: [phone] Fax: [phone]    Does the patient have skilled nursing Plus and need 100-day supply? (This applies to ALL medications) Patient does not have SCP

## 2025-02-09 DIAGNOSIS — F32.A ANXIETY AND DEPRESSION: ICD-10-CM

## 2025-02-09 DIAGNOSIS — F41.9 ANXIETY AND DEPRESSION: ICD-10-CM

## 2025-02-09 RX ORDER — LORAZEPAM 1 MG/1
0.5 TABLET ORAL NIGHTLY PRN
Qty: 15 TABLET | Refills: 0 | Status: CANCELLED | OUTPATIENT
Start: 2025-02-09 | End: 2025-03-11

## 2025-02-09 RX ORDER — LORAZEPAM 1 MG/1
0.5 TABLET ORAL NIGHTLY
Qty: 15 TABLET | Refills: 0 | Status: CANCELLED | OUTPATIENT
Start: 2025-02-09 | End: 2025-03-11

## 2025-02-10 DIAGNOSIS — F32.A ANXIETY AND DEPRESSION: ICD-10-CM

## 2025-02-10 DIAGNOSIS — F13.20 BENZODIAZEPINE DEPENDENCE, CONTINUOUS (HCC): ICD-10-CM

## 2025-02-10 DIAGNOSIS — F41.9 ANXIETY AND DEPRESSION: ICD-10-CM

## 2025-02-10 RX ORDER — LORAZEPAM 1 MG/1
0.5 TABLET ORAL NIGHTLY PRN
Qty: 15 TABLET | Refills: 0 | Status: CANCELLED | OUTPATIENT
Start: 2025-02-10 | End: 2025-03-12

## 2025-02-10 RX ORDER — LORAZEPAM 1 MG/1
0.5 TABLET ORAL NIGHTLY
Qty: 6 TABLET | Refills: 0 | Status: SHIPPED | OUTPATIENT
Start: 2025-02-10 | End: 2025-02-22

## 2025-02-10 NOTE — TELEPHONE ENCOUNTER
Jocelin Woodson M.D.  Isis Whittington, Med Ass't  Hey good morning Nancy,    Just wanted to let you know I have been trying to refill his Ativan due to Dr. Kenny having issues with it over the weekend. Looks like we are both having issues refilling it- Epic won't let us sign the order for some reason.

## 2025-02-19 ENCOUNTER — APPOINTMENT (OUTPATIENT)
Dept: INTERNAL MEDICINE | Facility: OTHER | Age: 55
End: 2025-02-19
Payer: MEDICAID

## 2025-02-19 ENCOUNTER — NON-PROVIDER VISIT (OUTPATIENT)
Dept: INTERNAL MEDICINE | Facility: OTHER | Age: 55
End: 2025-02-19
Payer: MEDICAID

## 2025-02-19 VITALS — WEIGHT: 315 LBS | BODY MASS INDEX: 46.5 KG/M2

## 2025-02-19 DIAGNOSIS — E66.01 MORBID OBESITY (HCC): ICD-10-CM

## 2025-02-19 PROCEDURE — 97803 MED NUTRITION INDIV SUBSEQ: CPT | Performed by: DIETITIAN, REGISTERED

## 2025-02-19 ASSESSMENT — FIBROSIS 4 INDEX: FIB4 SCORE: 1.36

## 2025-02-19 NOTE — PATIENT INSTRUCTIONS
Goals:  Ask Dr. Woodson about the allergy testing to help you feel fish is safe to eat  Check out www.[employer].com and search for tofu for great recipes to try  Check out Mrs. Mccord seasonings - sodium free  Some good protein powders -[employer] Nutrition powder (whey protein powder) or Huber (plant based protein powder). Mix with water in the  with frozen fruit to see how you like that \  Potatoes/rice are ok to have with meals. Try to stick to no more than 1 cup and add in those non-starchy veggies and lean protein   When you make pasta meals, focus on adding in extra veggies to the sauce or to the meal in general at a higher level   You could add riced cauliflower to your ground beef to help extend the beef   Keep up with the walking!

## 2025-02-19 NOTE — PROGRESS NOTES
Eric is here for FU with RD for weight management     Previously saw him in November 2024  His weight at that time was 385#  We had discussed walking on his treadmill 3 days per week, adding in a cup of veggie/fruit per meal and trying a lower fat and lower calorie dressing with veggies/salads    He did try the blue cheese [employer] salad dressings and really liked it even with the lower calories   Walking on the treadmill did not go as well  IN December his car broke and not able to use it - so walking to work and home outside a few times he'll get a ride. Getting in 10-12k steps per day   Did buy a bike that he can ride around as well - likes to ride with his little brother   Still keeping his soda down     Worked with Eric on finding some options for healthy meals by looking at websites together, discussing cooking tips to help him in the kitchen cook meals for him and his mom and setting goals around increasing veggies at meal times. RTC with RD in 1-2 months    Time spent: 60 minutes

## 2025-02-20 ENCOUNTER — HOSPITAL ENCOUNTER (OUTPATIENT)
Facility: MEDICAL CENTER | Age: 55
End: 2025-02-20
Payer: MEDICAID

## 2025-02-20 DIAGNOSIS — E55.9 VITAMIN D DEFICIENCY: ICD-10-CM

## 2025-02-20 LAB — 25(OH)D3 SERPL-MCNC: 20 NG/ML (ref 30–100)

## 2025-02-20 PROCEDURE — 36415 COLL VENOUS BLD VENIPUNCTURE: CPT

## 2025-02-20 PROCEDURE — 82306 VITAMIN D 25 HYDROXY: CPT

## 2025-02-25 ENCOUNTER — OFFICE VISIT (OUTPATIENT)
Dept: INTERNAL MEDICINE | Facility: OTHER | Age: 55
End: 2025-02-25
Payer: MEDICAID

## 2025-02-25 VITALS
DIASTOLIC BLOOD PRESSURE: 72 MMHG | HEART RATE: 72 BPM | HEIGHT: 76 IN | SYSTOLIC BLOOD PRESSURE: 123 MMHG | BODY MASS INDEX: 38.36 KG/M2 | TEMPERATURE: 96.8 F | WEIGHT: 315 LBS | OXYGEN SATURATION: 96 %

## 2025-02-25 DIAGNOSIS — L85.8 CUTANEOUS HORN: ICD-10-CM

## 2025-02-25 DIAGNOSIS — E78.00 PURE HYPERCHOLESTEROLEMIA: ICD-10-CM

## 2025-02-25 DIAGNOSIS — Z91.018 HISTORY OF FOOD ALLERGY: ICD-10-CM

## 2025-02-25 DIAGNOSIS — E55.9 VITAMIN D DEFICIENCY: ICD-10-CM

## 2025-02-25 RX ORDER — CHOLECALCIFEROL (VITAMIN D3) 50 MCG
2000 TABLET ORAL DAILY
Qty: 90 TABLET | Refills: 3 | Status: SHIPPED | OUTPATIENT
Start: 2025-02-25

## 2025-02-25 ASSESSMENT — ENCOUNTER SYMPTOMS
COUGH: 1
WHEEZING: 0
SHORTNESS OF BREATH: 0
HEMOPTYSIS: 0
SPUTUM PRODUCTION: 0

## 2025-02-25 ASSESSMENT — FIBROSIS 4 INDEX: FIB4 SCORE: 1.36

## 2025-02-25 NOTE — ASSESSMENT & PLAN NOTE
Has bothersome left lateral upper arm lesion of which he has had one prior that fell off. Exam shows likely cutaneous horn in early stages     -Lesion frozen with liquid nitrogen (topical). Tolerated well  -If not resolved will either trial again or biopsy

## 2025-02-25 NOTE — ASSESSMENT & PLAN NOTE
May 2024 lipid panel with , HDL 52, . ASCVD risk score 4.4%; no statin recommended however in the setting of his possible TIA  he was started on atorvastatin and aspirin. MRI brain came back without evidence of ischemia     -Discontinue atorvastatin (and aspirin)   -Continue following with Nutrition and heart healthy diet plan   -Recheck lipid panel 1-2 years

## 2025-02-25 NOTE — ASSESSMENT & PLAN NOTE
Discovered on labs done for fatigue. Does not have much sun exposure, trying to optimize diet. Taking vitamin D 1000 u daily but repeat levels 18 -> 20    -Double vitamin D dose to 2000 units daily

## 2025-02-25 NOTE — PATIENT INSTRUCTIONS
Discontinue atorvastatin and aspirin   I have doubled your vitamin D dose (now 2,000 units per day)  I have referred you to Allergy and Immunology for allergy testing. If you do not hear back either via MyChart or phone call within the next week with referral scheduling details, please call or send a Scripted message for assistance.    Use your indoor air purifier

## 2025-02-25 NOTE — PROGRESS NOTES
Established Patient    Patient Care Team:  Jocelin Woodson M.D. as PCP - General (Internal Medicine)    Eric Fitzgerald is a 54 y.o. male who presents today with the following Chief Complaint(s): Follow up for Diagnoses of Pure hypercholesterolemia, Vitamin D deficiency, History of food allergy, and Cutaneous horn were pertinent to this visit.    HPI:  Eric Fitzgerald is a 55 YO M with a PMHx anxiety and depression, HLD, severe obesity last seen by me in January at which time at which time we discussed again reducing his nightly Ativan from 1 mg to 0.5 mg and to continue his Celexa, we were awaiting MRI brain to rule out stroke before stopping the atorvastatin (low ASCVD risk score), and he was referred to sleep medicine for elevated STOPBANG score. He was found to have persistently low vitamin D (20), MRI  brain had no evidence of stroke. Today he returns to clinic to discuss:     -Allergy testing request: he saw [facility] Candice Benson and they were discussing healthy meat options and he was found to have environmental allergies and shellfish. He wants to know if he can have fish now without a reaction. He did once have an anaphylactic reaction to blue cheese.     -Cough: since having the high winds recently he feels his seasonal allergies have flared up since having to walk home in it. He is not needing his albuterol inhaler. The cough is very mild and intermittent. He has not been using his home air purifiers     -Skin lesion: left arm bump that has been irritating but is not growing. Had a similar one on his arm that fell off on its own    -Sleep medicine referral:  pending intake appointment in March with Dr. Mathur     -Anxiety: Of note has an appointment back with Dr. Roe (psychiatry). He has been cutting down his Ativan to 0.5 mg/night for about 3-4 nights but then needs to take 1 mg for a night.     Review of Systems   Respiratory:  Positive for cough. Negative for hemoptysis, sputum  "production, shortness of breath and wheezing.        Past Medical History:   Diagnosis Date    Anxiety     Asthma 05/14/2024    Depression     Fatigue 05/14/2024       Social History     Tobacco Use    Smoking status: Never    Smokeless tobacco: Never   Vaping Use    Vaping status: Never Used   Substance Use Topics    Alcohol use: Not Currently     Comment: occasional, rare    Drug use: Not Currently       Current Outpatient Medications   Medication Sig Dispense Refill    Vitamin D, Cholecalciferol, 2000 UNIT Tab Take 1 Tablet by mouth every day. 90 Tablet 3    citalopram (CELEXA) 40 MG Tab Take 1 Tablet by mouth every day. 90 Tablet 3    multivitamin Tab Take 1 Tablet by mouth every day.      Ascorbic Acid (VITAMIN C PO) Take  by mouth.      B Complex Vitamins (VITAMIN B COMPLEX PO) Take  by mouth.      Fexofenadine HCl (ALLEGRA ALLERGY PO) Take  by mouth.      albuterol 108 (90 Base) MCG/ACT Aero Soln inhalation aerosol Inhale 2 Puffs every 6 hours as needed for Shortness of Breath.       No current facility-administered medications for this visit.       Results for orders placed or performed during the hospital encounter of 02/20/25   VITAMIN D,25 HYDROXY (DEFICIENCY)    Collection Time: 02/20/25  1:05 PM   Result Value Ref Range    25-Hydroxy   Vitamin D 25 20 (L) 30 - 100 ng/mL       /72 (BP Location: Right arm, Patient Position: Sitting, BP Cuff Size: Large adult)   Pulse 72   Temp 36 °C (96.8 °F) (Temporal)   Ht 1.93 m (6' 4\")   Wt (!) 172 kg (378 lb 9.6 oz)   SpO2 96%   BMI 46.08 kg/m²     Physical Exam  Vitals reviewed.   Constitutional:       General: He is not in acute distress.     Appearance: He is obese. He is not ill-appearing, toxic-appearing or diaphoretic.   HENT:      Head: Normocephalic and atraumatic.   Eyes:      Extraocular Movements: Extraocular movements intact.   Cardiovascular:      Rate and Rhythm: Normal rate.   Pulmonary:      Effort: Pulmonary effort is normal. No " respiratory distress.      Breath sounds: Normal breath sounds. No stridor. No wheezing, rhonchi or rales.   Skin:     Comments: Left upper arm hyperkeratotic spiked lesion    Neurological:      General: No focal deficit present.      Mental Status: He is alert and oriented to person, place, and time.      Gait: Gait normal.   Psychiatric:         Mood and Affect: Mood normal.         Behavior: Behavior normal.         Thought Content: Thought content normal.         Judgment: Judgment normal.           Assessment and Plan:     Cutaneous horn  Has bothersome left lateral upper arm lesion of which he has had one prior that fell off. Exam shows likely cutaneous horn in early stages     -Lesion frozen with liquid nitrogen (topical). Tolerated well  -If not resolved will either trial again or biopsy     History of food allergy  Reports had fish allergy in the past and would like to see if still does because wants to incorporate fish into his diet     -Referral to Allergy for dietary allergen testing per patient request     Vitamin D deficiency  Discovered on labs done for fatigue. Does not have much sun exposure, trying to optimize diet. Taking vitamin D 1000 u daily but repeat levels 18 -> 20    -Double vitamin D dose to 2000 units daily        Pure hypercholesterolemia  May 2024 lipid panel with , HDL 52, . ASCVD risk score 4.4%; no statin recommended however in the setting of his possible TIA  he was started on atorvastatin and aspirin. MRI brain came back without evidence of ischemia     -Discontinue atorvastatin (and aspirin)   -Continue following with Nutrition and heart healthy diet plan   -Recheck lipid panel 1-2 years            Orders Placed This Encounter    Referral to Allergy    Vitamin D, Cholecalciferol, 2000 UNIT Tab         Case discussed with Dr. Leroy Woodson M.D. PGY III  [facility]

## 2025-02-26 ENCOUNTER — TELEPHONE (OUTPATIENT)
Dept: SLEEP MEDICINE | Facility: MEDICAL CENTER | Age: 55
End: 2025-02-26
Payer: MEDICAID

## 2025-02-27 NOTE — Clinical Note
REFERRAL APPROVAL NOTICE         Sent on February 27, 2025                   Eric Fitzgerald  [address]  [address]  [place] [address]                   Dear Mr. Fitzgerald,    After a careful review of the medical information and benefit coverage, [facility] has processed your referral. See below for additional details.    If applicable, you must be actively enrolled with your insurance for coverage of the authorized service. If you have any questions regarding your coverage, please contact your insurance directly.    REFERRAL INFORMATION   Referral #:  [id number]  Referred-To Provider    Referred-By Provider:  Allergy and Immunology    Jocelin Woodson M.D.   [facility]      [address] [facility] [place] [address]  [phone] [address] [place] [address]  [phone]    Referral Start Date:  02/25/2025  Referral End Date:   02/25/2026             SCHEDULING  If you do not already have an appointment, please call [phone] to make an appointment.     MORE INFORMATION  If you do not already have a [employer] account, sign up at: [email or web address]  You can access your medical information, make appointments, see lab results, billing information, and more.  If you have questions regarding this referral, please contact  the [facility] Referrals department at:             [phone]. Monday - Friday 8:00AM - 5:00PM.     Sincerely,    [facility]

## 2025-03-05 ENCOUNTER — TELEPHONE (OUTPATIENT)
Dept: CARDIOLOGY | Facility: MEDICAL CENTER | Age: 55
End: 2025-03-05
Payer: MEDICAID

## 2025-03-06 NOTE — TELEPHONE ENCOUNTER
LINDSEY EOS to  for review on 3/6/25 as ADD    Preliminary findings:    15 episodes of SVT  with an avg rate of 112 bpm    Sinus Rhythm  with an avg rate of 74 bpm    Rare Supraventricular ectopics    Rare Ventricular ectopics; no noted triplets    Ventricular Bigeminy and Trigeminy present      To Patient- These findings are preliminary and HAVE NOT yet been reviewed by your provider. Please allow our team time to review these findings and someone from our office will contact you if any follow up is necessary.

## 2025-03-10 ENCOUNTER — TELEPHONE (OUTPATIENT)
Dept: INTERNAL MEDICINE | Facility: OTHER | Age: 55
End: 2025-03-10
Payer: MEDICAID

## 2025-03-10 DIAGNOSIS — F41.9 ANXIETY AND DEPRESSION: ICD-10-CM

## 2025-03-10 DIAGNOSIS — F13.20 BENZODIAZEPINE DEPENDENCE, CONTINUOUS (HCC): ICD-10-CM

## 2025-03-10 DIAGNOSIS — F32.A ANXIETY AND DEPRESSION: ICD-10-CM

## 2025-03-10 NOTE — TELEPHONE ENCOUNTER
Eric called requesting refill of lorazapam to be sent to [facility]. He only has 2 pills left.

## 2025-03-11 ENCOUNTER — PATIENT MESSAGE (OUTPATIENT)
Dept: SLEEP MEDICINE | Facility: MEDICAL CENTER | Age: 55
End: 2025-03-11
Payer: MEDICAID

## 2025-03-11 DIAGNOSIS — F13.20 BENZODIAZEPINE DEPENDENCE, CONTINUOUS (HCC): ICD-10-CM

## 2025-03-11 NOTE — TELEPHONE ENCOUNTER
Received request via: Patient    Was the patient seen in the last year in this department? Yes    Does the patient have an active prescription (recently filled or refills available) for medication(s) requested? No    Pharmacy Name: [facility] [place]    Does the patient have residential Plus and need 100-day supply? (This applies to ALL medications) Patient does not have SCP

## 2025-03-13 ENCOUNTER — APPOINTMENT (OUTPATIENT)
Dept: SLEEP MEDICINE | Facility: MEDICAL CENTER | Age: 55
End: 2025-03-13
Payer: MEDICAID

## 2025-03-13 DIAGNOSIS — F13.20 BENZODIAZEPINE DEPENDENCE, CONTINUOUS (HCC): ICD-10-CM

## 2025-03-13 DIAGNOSIS — F41.9 ANXIETY AND DEPRESSION: ICD-10-CM

## 2025-03-13 DIAGNOSIS — F32.A ANXIETY AND DEPRESSION: ICD-10-CM

## 2025-03-14 RX ORDER — LORAZEPAM 0.5 MG/1
0.5 TABLET ORAL
Qty: 10 TABLET | Refills: 0 | Status: SHIPPED | OUTPATIENT
Start: 2025-03-14 | End: 2025-03-24

## 2025-05-19 ENCOUNTER — APPOINTMENT (OUTPATIENT)
Dept: INTERNAL MEDICINE | Facility: OTHER | Age: 55
End: 2025-05-19
Payer: MEDICAID

## 2025-09-05 ENCOUNTER — APPOINTMENT (OUTPATIENT)
Dept: INTERNAL MEDICINE | Facility: OTHER | Age: 55
End: 2025-09-05
Payer: MEDICAID